# Patient Record
Sex: FEMALE | Race: WHITE | NOT HISPANIC OR LATINO | URBAN - METROPOLITAN AREA
[De-identification: names, ages, dates, MRNs, and addresses within clinical notes are randomized per-mention and may not be internally consistent; named-entity substitution may affect disease eponyms.]

---

## 2018-08-28 PROBLEM — J30.9 ALLERGIC RHINITIS: Status: ACTIVE | Noted: 2018-08-28

## 2018-08-28 PROBLEM — F41.9 ANXIETY: Status: ACTIVE | Noted: 2018-08-28

## 2018-08-30 ENCOUNTER — OFFICE VISIT (OUTPATIENT)
Dept: INTERNAL MEDICINE | Age: 43
End: 2018-08-30

## 2018-08-30 VITALS
OXYGEN SATURATION: 100 % | SYSTOLIC BLOOD PRESSURE: 116 MMHG | TEMPERATURE: 97.9 F | HEART RATE: 101 BPM | HEIGHT: 64 IN | BODY MASS INDEX: 26.87 KG/M2 | DIASTOLIC BLOOD PRESSURE: 80 MMHG | WEIGHT: 157.4 LBS

## 2018-08-30 DIAGNOSIS — Z00.00 ANNUAL PHYSICAL EXAM: Primary | ICD-10-CM

## 2018-08-30 DIAGNOSIS — Z13.220 SCREENING FOR LIPID DISORDERS: ICD-10-CM

## 2018-08-30 DIAGNOSIS — Z12.11 COLON CANCER SCREENING: ICD-10-CM

## 2018-08-30 DIAGNOSIS — Z13.1 SCREENING FOR DIABETES MELLITUS: ICD-10-CM

## 2018-08-30 DIAGNOSIS — Z00.00 ROUTINE ADULT HEALTH MAINTENANCE: ICD-10-CM

## 2018-08-30 PROCEDURE — 99396 PREV VISIT EST AGE 40-64: CPT | Performed by: NURSE PRACTITIONER

## 2018-08-30 RX ORDER — MELATONIN
1000 DAILY
COMMUNITY

## 2018-08-30 RX ORDER — CHLORAL HYDRATE 500 MG
2000 CAPSULE ORAL
COMMUNITY

## 2018-08-30 NOTE — PROGRESS NOTES
"[facility] [place] [facility]        Crystal Garcia / 43 y.o. / female  08/30/2018      ASSESSMENT & PLAN:    Problem List Items Addressed This Visit     None        No orders of the defined types were placed in this encounter.    No orders of the defined types were placed in this encounter.      Summary/Discussion:  ***    No Follow-up on file.    ____________________________________________________________________    VITALS:    Visit Vitals  /80   Pulse 101   Temp 97.9 °F (36.6 °C)   Ht 162.6 cm (64\")   Wt 71.4 kg (157 lb 6.4 oz)   SpO2 100%   BMI 27.02 kg/m²       BP Readings from Last 3 Encounters:   08/30/18 116/80   04/18/18 125/85   04/09/18 121/86     Wt Readings from Last 3 Encounters:   08/30/18 71.4 kg (157 lb 6.4 oz)   04/18/18 68 kg (150 lb)   04/09/18 68 kg (150 lb)      Body mass index is 27.02 kg/m².    CC: Main reason(s) for today's visit: Establish Care and Annual Exam      HPI:    Patient is a 43 y.o. female who is here to establish care.       Patient Care Team:  Davina Alex APRN as PCP - General (Internal Medicine)  ____________________________________________________________________      REVIEW OF SYSTEMS    Review of Systems      PHYSICAL EXAMINATION    Physical Exam    REVIEWED DATA:    Labs:   No results found for: NA, K, AST, ALT, BUN, CREATININE, EGFRIFNONA, EGFRIFAFRI    No results found for: GLUCOSE, HGBA1C, MICROALBUR    No results found for: LDL, HDL, TRIG, CHOLHDLRATIO    No results found for: TSH, FREET4     No results found for: WBC, HGB, PLT      Imaging:        Medical Tests:        Summary of old records / correspondence / consultant report:        Request outside records:        ______________________________________________________________________    ALLERGIES  Allergies   Allergen Reactions   • Other Hives     STEROIDS         MEDICATIONS  Current Outpatient Prescriptions   Medication Sig Dispense Refill   • fluticasone (FLONASE) 50 MCG/ACT nasal spray into each nostril.   "   • azithromycin (ZITHROMAX Z-MILTON) 250 MG tablet Take 2 tablets the first day, then 1 tablet daily for 4 days. 6 tablet 0   • benzonatate (TESSALON) 200 MG capsule Take 1 capsule by mouth 3 (Three) Times a Day As Needed for Cough. 30 capsule 0   • Cefixime (SUPRAX) 400 MG tablet 1 capsule once a day 10 capsule 0   • Chlorcyclizine-Pseudoephed (STAHIST AD) 25-60 MG tablet 1 tablet 3 times a day as needed for congestion 30 tablet 0   • valACYclovir (VALTREX) 1000 MG tablet 2 PO Q12 4 tablet 5     No current facility-administered medications for this visit.        PFSH:     The following portions of the patient's history were reviewed and updated as appropriate: Allergies / Current Medications / Past Medical History / Surgical History / Social History / Family History    PROBLEM LIST   Patient Active Problem List   Diagnosis   • Allergic rhinitis   • Anxiety       PAST MEDICAL HISTORY  Past Medical History:   Diagnosis Date   • Allergic    • Asthma    • Headache    • Migraine headache        SURGICAL HISTORY  Past Surgical History:   Procedure Laterality Date   • HYSTERECTOMY  2009    partial        SOCIAL HISTORY  Social History     Social History   • Marital status:    • Number of children: 2     Occupational History   •        [employer]      Social History Main Topics   • Smoking status: Never Smoker   • Smokeless tobacco: Never Used   • Alcohol use Yes      Comment: occasional    • Drug use: No   • Sexual activity: Yes     Other Topics Concern   • Not on file     Social History Narrative    1 grandchild       FAMILY HISTORY  Family History   Problem Relation Age of Onset   • No Known Problems Mother    • No Known Problems Father          **Aleena Disclaimer:   Much of this encounter note is an electronic transcription/translation of spoken language to printed text. The electronic translation of spoken language may permit erroneous, or at times, nonsensical words or phrases to be  inadvertently transcribed. Although I have reviewed the note for such errors, some may still exist.

## 2018-08-30 NOTE — PATIENT INSTRUCTIONS
WOMEN'S FIRST APPOINTMENT LINE [phone]     START OTC 24 HOUR ANTIHISTAMINE (CLARITIN, ZYRTEC, ALLEGRA)       Health Maintenance, Female  Adopting a healthy lifestyle and getting preventive care can go a long way to promote health and wellness. Talk with your health care provider about what schedule of regular examinations is right for you. This is a good chance for you to check in with your provider about disease prevention and staying healthy.  In between checkups, there are plenty of things you can do on your own. Experts have done a lot of research about which lifestyle changes and preventive measures are most likely to keep you healthy. Ask your health care provider for more information.  Weight and diet  Eat a healthy diet  · Be sure to include plenty of vegetables, fruits, low-fat dairy products, and lean protein.  · Do not eat a lot of foods high in solid fats, added sugars, or salt.  · Get regular exercise. This is one of the most important things you can do for your health.  ? Most adults should exercise for at least 150 minutes each week. The exercise should increase your heart rate and make you sweat (moderate-intensity exercise).  ? Most adults should also do strengthening exercises at least twice a week. This is in addition to the moderate-intensity exercise.    Maintain a healthy weight  · Body mass index (BMI) is a measurement that can be used to identify possible weight problems. It estimates body fat based on height and weight. Your health care provider can help determine your BMI and help you achieve or maintain a healthy weight.  · For females 20 years of age and older:  ? A BMI below 18.5 is considered underweight.  ? A BMI of 18.5 to 24.9 is normal.  ? A BMI of 25 to 29.9 is considered overweight.  ? A BMI of 30 and above is considered obese.    Watch levels of cholesterol and blood lipids  · You should start having your blood tested for lipids and cholesterol at 20 years of age, then have  this test every 5 years.  · You may need to have your cholesterol levels checked more often if:  ? Your lipid or cholesterol levels are high.  ? You are older than 50 years of age.  ? You are at high risk for heart disease.    Cancer screening  Lung Cancer  · Lung cancer screening is recommended for adults 55-80 years old who are at high risk for lung cancer because of a history of smoking.  · A yearly low-dose CT scan of the lungs is recommended for people who:  ? Currently smoke.  ? Have quit within the past 15 years.  ? Have at least a 30-pack-year history of smoking. A pack year is smoking an average of one pack of cigarettes a day for 1 year.  · Yearly screening should continue until it has been 15 years since you quit.  · Yearly screening should stop if you develop a health problem that would prevent you from having lung cancer treatment.    Breast Cancer  · Practice breast self-awareness. This means understanding how your breasts normally appear and feel.  · It also means doing regular breast self-exams. Let your health care provider know about any changes, no matter how small.  · If you are in your 20s or 30s, you should have a clinical breast exam (CBE) by a health care provider every 1-3 years as part of a regular health exam.  · If you are 40 or older, have a CBE every year. Also consider having a breast X-ray (mammogram) every year.  · If you have a family history of breast cancer, talk to your health care provider about genetic screening.  · If you are at high risk for breast cancer, talk to your health care provider about having an MRI and a mammogram every year.  · Breast cancer gene (BRCA) assessment is recommended for women who have family members with BRCA-related cancers. BRCA-related cancers include:  ? Breast.  ? Ovarian.  ? Tubal.  ? Peritoneal cancers.  · Results of the assessment will determine the need for genetic counseling and BRCA1 and BRCA2 testing.    Cervical Cancer  Your health care  provider may recommend that you be screened regularly for cancer of the pelvic organs (ovaries, uterus, and vagina). This screening involves a pelvic examination, including checking for microscopic changes to the surface of your cervix (Pap test). You may be encouraged to have this screening done every 3 years, beginning at age 21.  · For women ages 30-65, health care providers may recommend pelvic exams and Pap testing every 3 years, or they may recommend the Pap and pelvic exam, combined with testing for human papilloma virus (HPV), every 5 years. Some types of HPV increase your risk of cervical cancer. Testing for HPV may also be done on women of any age with unclear Pap test results.  · Other health care providers may not recommend any screening for nonpregnant women who are considered low risk for pelvic cancer and who do not have symptoms. Ask your health care provider if a screening pelvic exam is right for you.  · If you have had past treatment for cervical cancer or a condition that could lead to cancer, you need Pap tests and screening for cancer for at least 20 years after your treatment. If Pap tests have been discontinued, your risk factors (such as having a new sexual partner) need to be reassessed to determine if screening should resume. Some women have medical problems that increase the chance of getting cervical cancer. In these cases, your health care provider may recommend more frequent screening and Pap tests.    Colorectal Cancer  · This type of cancer can be detected and often prevented.  · Routine colorectal cancer screening usually begins at 50 years of age and continues through 75 years of age.  · Your health care provider may recommend screening at an earlier age if you have risk factors for colon cancer.  · Your health care provider may also recommend using home test kits to check for hidden blood in the stool.  · A small camera at the end of a tube can be used to examine your colon  directly (sigmoidoscopy or colonoscopy). This is done to check for the earliest forms of colorectal cancer.  · Routine screening usually begins at age 50.  · Direct examination of the colon should be repeated every 5-10 years through 75 years of age. However, you may need to be screened more often if early forms of precancerous polyps or small growths are found.    Skin Cancer  · Check your skin from head to toe regularly.  · Tell your health care provider about any new moles or changes in moles, especially if there is a change in a mole's shape or color.  · Also tell your health care provider if you have a mole that is larger than the size of a pencil eraser.  · Always use sunscreen. Apply sunscreen liberally and repeatedly throughout the day.  · Protect yourself by wearing long sleeves, pants, a wide-brimmed hat, and sunglasses whenever you are outside.    Heart disease, diabetes, and high blood pressure  · High blood pressure causes heart disease and increases the risk of stroke. High blood pressure is more likely to develop in:  ? People who have blood pressure in the high end of the normal range (130-139/85-89 mm Hg).  ? People who are overweight or obese.  ? People who are .  · If you are 18-39 years of age, have your blood pressure checked every 3-5 years. If you are 40 years of age or older, have your blood pressure checked every year. You should have your blood pressure measured twice--once when you are at a hospital or clinic, and once when you are not at a hospital or clinic. Record the average of the two measurements. To check your blood pressure when you are not at a hospital or clinic, you can use:  ? An automated blood pressure machine at a pharmacy.  ? A home blood pressure monitor.  · If you are between 55 years and 79 years old, ask your health care provider if you should take aspirin to prevent strokes.  · Have regular diabetes screenings. This involves taking a blood sample to  check your fasting blood sugar level.  ? If you are at a normal weight and have a low risk for diabetes, have this test once every three years after 45 years of age.  ? If you are overweight and have a high risk for diabetes, consider being tested at a younger age or more often.  Preventing infection  Hepatitis B  · If you have a higher risk for hepatitis B, you should be screened for this virus. You are considered at high risk for hepatitis B if:  ? You were born in a country where hepatitis B is common. Ask your health care provider which countries are considered high risk.  ? Your parents were born in a high-risk country, and you have not been immunized against hepatitis B (hepatitis B vaccine).  ? You have HIV or AIDS.  ? You use needles to inject street drugs.  ? You live with someone who has hepatitis B.  ? You have had sex with someone who has hepatitis B.  ? You get hemodialysis treatment.  ? You take certain medicines for conditions, including cancer, organ transplantation, and autoimmune conditions.    Hepatitis C  · Blood testing is recommended for:  ? Everyone born from 1945 through 1965.  ? Anyone with known risk factors for hepatitis C.    Sexually transmitted infections (STIs)  · You should be screened for sexually transmitted infections (STIs) including gonorrhea and chlamydia if:  ? You are sexually active and are younger than 24 years of age.  ? You are older than 24 years of age and your health care provider tells you that you are at risk for this type of infection.  ? Your sexual activity has changed since you were last screened and you are at an increased risk for chlamydia or gonorrhea. Ask your health care provider if you are at risk.  · If you do not have HIV, but are at risk, it may be recommended that you take a prescription medicine daily to prevent HIV infection. This is called pre-exposure prophylaxis (PrEP). You are considered at risk if:  ? You are sexually active and do not regularly  use condoms or know the HIV status of your partner(s).  ? You take drugs by injection.  ? You are sexually active with a partner who has HIV.    Talk with your health care provider about whether you are at high risk of being infected with HIV. If you choose to begin PrEP, you should first be tested for HIV. You should then be tested every 3 months for as long as you are taking PrEP.  Pregnancy  · If you are premenopausal and you may become pregnant, ask your health care provider about preconception counseling.  · If you may become pregnant, take 400 to 800 micrograms (mcg) of folic acid every day.  · If you want to prevent pregnancy, talk to your health care provider about birth control (contraception).  Osteoporosis and menopause  · Osteoporosis is a disease in which the bones lose minerals and strength with aging. This can result in serious bone fractures. Your risk for osteoporosis can be identified using a bone density scan.  · If you are 65 years of age or older, or if you are at risk for osteoporosis and fractures, ask your health care provider if you should be screened.  · Ask your health care provider whether you should take a calcium or vitamin D supplement to lower your risk for osteoporosis.  · Menopause may have certain physical symptoms and risks.  · Hormone replacement therapy may reduce some of these symptoms and risks.  Talk to your health care provider about whether hormone replacement therapy is right for you.  Follow these instructions at home:  · Schedule regular health, dental, and eye exams.  · Stay current with your immunizations.  · Do not use any tobacco products including cigarettes, chewing tobacco, or electronic cigarettes.  · If you are pregnant, do not drink alcohol.  · If you are breastfeeding, limit how much and how often you drink alcohol.  · Limit alcohol intake to no more than 1 drink per day for nonpregnant women. One drink equals 12 ounces of beer, 5 ounces of wine, or 1½ ounces  of hard liquor.  · Do not use street drugs.  · Do not share needles.  · Ask your health care provider for help if you need support or information about quitting drugs.  · Tell your health care provider if you often feel depressed.  · Tell your health care provider if you have ever been abused or do not feel safe at home.  This information is not intended to replace advice given to you by your health care provider. Make sure you discuss any questions you have with your health care provider.  Document Released: 07/02/2012 Document Revised: 05/25/2017 Document Reviewed: 09/20/2016  [employer] Interactive Patient Education © 2018 [employer]

## 2018-09-07 LAB
25(OH)D3+25(OH)D2 SERPL-MCNC: 38.7 NG/ML (ref 30–100)
ALBUMIN SERPL-MCNC: 4.7 G/DL (ref 3.5–5.2)
ALBUMIN/GLOB SERPL: 1.6 G/DL
ALP SERPL-CCNC: 67 U/L (ref 39–117)
ALT SERPL-CCNC: 18 U/L (ref 1–33)
AST SERPL-CCNC: 18 U/L (ref 1–32)
BASOPHILS # BLD AUTO: 0.05 10*3/MM3 (ref 0–0.2)
BASOPHILS NFR BLD AUTO: 0.6 % (ref 0–1.5)
BILIRUB SERPL-MCNC: 0.3 MG/DL (ref 0.1–1.2)
BUN SERPL-MCNC: 10 MG/DL (ref 6–20)
BUN/CREAT SERPL: 15.2 (ref 7–25)
CALCIUM SERPL-MCNC: 9.2 MG/DL (ref 8.6–10.5)
CHLORIDE SERPL-SCNC: 101 MMOL/L (ref 98–107)
CHOLEST SERPL-MCNC: 213 MG/DL (ref 0–200)
CHOLEST/HDLC SERPL: 3.61 {RATIO}
CO2 SERPL-SCNC: 23 MMOL/L (ref 22–29)
CREAT SERPL-MCNC: 0.66 MG/DL (ref 0.57–1)
EOSINOPHIL # BLD AUTO: 0.29 10*3/MM3 (ref 0–0.7)
EOSINOPHIL NFR BLD AUTO: 3.4 % (ref 0.3–6.2)
ERYTHROCYTE [DISTWIDTH] IN BLOOD BY AUTOMATED COUNT: 13.3 % (ref 11.7–13)
GLOBULIN SER CALC-MCNC: 2.9 GM/DL
GLUCOSE SERPL-MCNC: 79 MG/DL (ref 65–99)
HCT VFR BLD AUTO: 41.7 % (ref 35.6–45.5)
HDLC SERPL-MCNC: 59 MG/DL (ref 40–60)
HGB BLD-MCNC: 13.7 G/DL (ref 11.9–15.5)
IMM GRANULOCYTES # BLD: 0.02 10*3/MM3 (ref 0–0.03)
IMM GRANULOCYTES NFR BLD: 0.2 % (ref 0–0.5)
LDLC SERPL CALC-MCNC: 137 MG/DL (ref 0–100)
LYMPHOCYTES # BLD AUTO: 2.09 10*3/MM3 (ref 0.9–4.8)
LYMPHOCYTES NFR BLD AUTO: 24.2 % (ref 19.6–45.3)
MCH RBC QN AUTO: 31.8 PG (ref 26.9–32)
MCHC RBC AUTO-ENTMCNC: 32.9 G/DL (ref 32.4–36.3)
MCV RBC AUTO: 96.8 FL (ref 80.5–98.2)
MONOCYTES # BLD AUTO: 0.59 10*3/MM3 (ref 0.2–1.2)
MONOCYTES NFR BLD AUTO: 6.8 % (ref 5–12)
NEUTROPHILS # BLD AUTO: 5.62 10*3/MM3 (ref 1.9–8.1)
NEUTROPHILS NFR BLD AUTO: 65 % (ref 42.7–76)
PLATELET # BLD AUTO: 455 10*3/MM3 (ref 140–500)
POTASSIUM SERPL-SCNC: 3.9 MMOL/L (ref 3.5–5.2)
PROT SERPL-MCNC: 7.6 G/DL (ref 6–8.5)
RBC # BLD AUTO: 4.31 10*6/MM3 (ref 3.9–5.2)
SODIUM SERPL-SCNC: 139 MMOL/L (ref 136–145)
TRIGL SERPL-MCNC: 84 MG/DL (ref 0–150)
TSH SERPL DL<=0.005 MIU/L-ACNC: 2.51 MIU/ML (ref 0.27–4.2)
VLDLC SERPL CALC-MCNC: 16.8 MG/DL (ref 5–40)
WBC # BLD AUTO: 8.64 10*3/MM3 (ref 4.5–10.7)

## 2018-09-19 ENCOUNTER — OFFICE VISIT (OUTPATIENT)
Dept: INTERNAL MEDICINE | Age: 43
End: 2018-09-19

## 2018-09-19 VITALS
HEIGHT: 64 IN | WEIGHT: 155 LBS | BODY MASS INDEX: 26.46 KG/M2 | SYSTOLIC BLOOD PRESSURE: 126 MMHG | DIASTOLIC BLOOD PRESSURE: 82 MMHG | HEART RATE: 85 BPM | OXYGEN SATURATION: 99 % | TEMPERATURE: 98.3 F

## 2018-09-19 DIAGNOSIS — J30.9 ALLERGIC RHINITIS, UNSPECIFIED CHRONICITY, UNSPECIFIED SEASONALITY, UNSPECIFIED TRIGGER: ICD-10-CM

## 2018-09-19 DIAGNOSIS — E78.5 HYPERLIPIDEMIA LDL GOAL <130: Primary | ICD-10-CM

## 2018-09-19 PROCEDURE — 99214 OFFICE O/P EST MOD 30 MIN: CPT | Performed by: NURSE PRACTITIONER

## 2018-09-19 RX ORDER — AZELASTINE 1 MG/ML
1 SPRAY, METERED NASAL 2 TIMES DAILY
Qty: 1 EACH | Refills: 5 | Status: SHIPPED | OUTPATIENT
Start: 2018-09-19 | End: 2019-06-05

## 2018-09-19 NOTE — PROGRESS NOTES
Subjective   Crystal Garcia is a 43 y.o. female.     History of Present Illness     Patient here today for follow-up lab results and allergic rhinitis.    She is concerned as her recent fasting labs showed some mild elevation in total cholesterol and LDL cholesterol.  She reports that she avoids eating red meat, eats chicken mostly. She does admit to eating carbs regularly including rice and potato chips. No family history significant for hyperlipidemia or heart disease or stroke.     Allergic Rhinitis: Crystal Garcia is here for re-evaluation of allergic rhinitis. Patient's symptoms include clear rhinorrhea and nasal congestion. These symptoms are seasonal. Current triggers include exposure to pollens. The patient has tried Flonase and Allegra since last visit with inadequate relief of symptoms. Immunotherapy has never been tried. The patient has never had nasal polyps. The patient has no history of asthma. The patient does not suffer from frequent sinopulmonary infections. The patient has not had sinus surgery in the past. The patient has no history of eczema.      The following portions of the patient's history were reviewed and updated as appropriate: allergies, current medications, past family history, past medical history, past social history, past surgical history and problem list.    Review of Systems   Constitutional: Negative for chills and fever.   Respiratory: Negative for shortness of breath.    Cardiovascular: Negative for chest pain.       Objective   Physical Exam   Constitutional: She appears well-developed and well-nourished. She is active. She does not appear ill. No distress.   Cardiovascular: Normal rate, regular rhythm and normal heart sounds.    Pulmonary/Chest: Effort normal and breath sounds normal. She has no decreased breath sounds. She has no wheezes. She has no rhonchi. She has no rales.   Neurological: She is alert.   Nursing note and vitals reviewed.        Assessment/Plan   Problems  Addressed this Visit        Respiratory    Allergic rhinitis    Relevant Medications    azelastine (ASTELIN) 0.1 % nasal spray      Other Visit Diagnoses     Hyperlipidemia LDL goal <130    -  Primary        1. Hyperlipidemia LDL goal <130  Discussed with patient calculated ASCVD Risk at this point 0.7%. No significant family history of hyperlipidemia, CVA, or CAD.  Discussed diet changes including reduction of simple carbohydrates, increased activity, and weight loss. Will re-evaluate FLP at annual physical next year.    2. Allergic rhinitis, unspecified chronicity, unspecified seasonality, unspecified trigger  Continue Flonase and by mouth antihistamine.  Will add azelastine spray to protocol.  Follow-up if still no improvement, may need to consider referral to allergy.     - azelastine (ASTELIN) 0.1 % nasal spray; 1 spray into the nostril(s) as directed by provider 2 (Two) Times a Day. Use in each nostril as directed  Dispense: 1 each; Refill: 5

## 2018-09-19 NOTE — PATIENT INSTRUCTIONS
"High Cholesterol    High cholesterol is a condition in which the blood has high levels of a white, waxy, fat-like substance (cholesterol). The human body needs small amounts of cholesterol. The liver makes all the cholesterol that the body needs. Extra (excess) cholesterol comes from the food that we eat.  Cholesterol is carried from the liver by the blood through the blood vessels. If you have high cholesterol, deposits (plaques) may build up on the walls of your blood vessels (arteries). Plaques make the arteries narrower and stiffer. Cholesterol plaques increase your risk for heart attack and stroke. Work with your health care provider to keep your cholesterol levels in a healthy range.  What increases the risk?  This condition is more likely to develop in people who:  · Eat foods that are high in animal fat (saturated fat) or cholesterol.  · Are overweight.  · Are not getting enough exercise.  · Have a family history of high cholesterol.    What are the signs or symptoms?  There are no symptoms of this condition.  How is this diagnosed?  This condition may be diagnosed from the results of a blood test.  · If you are older than age 20, your health care provider may check your cholesterol every 4-6 years.  · You may be checked more often if you already have high cholesterol or other risk factors for heart disease.    The blood test for cholesterol measures:  · \"Bad\" cholesterol (LDL cholesterol). This is the main type of cholesterol that causes heart disease. The desired level for LDL is less than 100.  · \"Good\" cholesterol (HDL cholesterol). This type helps to protect against heart disease by cleaning the arteries and carrying the LDL away. The desired level for HDL is 60 or higher.  · Triglycerides. These are fats that the body can store or burn for energy. The desired number for triglycerides is lower than 150.  · Total cholesterol. This is a measure of the total amount of cholesterol in your blood, including " LDL cholesterol, HDL cholesterol, and triglycerides. A healthy number is less than 200.    How is this treated?  This condition is treated with diet changes, lifestyle changes, and medicines.  Diet changes  · This may include eating more whole grains, fruits, vegetables, nuts, and fish.  · This may also include cutting back on red meat and foods that have a lot of added sugar.  Lifestyle changes  · Changes may include getting at least 40 minutes of aerobic exercise 3 times a week. Aerobic exercises include walking, biking, and swimming. Aerobic exercise along with a healthy diet can help you maintain a healthy weight.  · Changes may also include quitting smoking.  Medicines  · Medicines are usually given if diet and lifestyle changes have failed to reduce your cholesterol to healthy levels.  · Your health care provider may prescribe a statin medicine. Statin medicines have been shown to reduce cholesterol, which can reduce the risk of heart disease.  Follow these instructions at home:  Eating and drinking    If told by your health care provider:  · Eat chicken (without skin), fish, veal, shellfish, ground turkey breast, and round or loin cuts of red meat.  · Do not eat fried foods or fatty meats, such as hot dogs and salami.  · Eat plenty of fruits, such as apples.  · Eat plenty of vegetables, such as broccoli, potatoes, and carrots.  · Eat beans, peas, and lentils.  · Eat grains such as barley, rice, couscous, and bulgur wheat.  · Eat pasta without cream sauces.  · Use skim or nonfat milk, and eat low-fat or nonfat yogurt and cheeses.  · Do not eat or drink whole milk, cream, ice cream, egg yolks, or hard cheeses.  · Do not eat stick margarine or tub margarines that contain trans fats (also called partially hydrogenated oils).  · Do not eat saturated tropical oils, such as coconut oil and palm oil.  · Do not eat cakes, cookies, crackers, or other baked goods that contain trans fats.    General  instructions  · Exercise as directed by your health care provider. Increase your activity level with activities such as gardening, walking, and taking the stairs.  · Take over-the-counter and prescription medicines only as told by your health care provider.  · Do not use any products that contain nicotine or tobacco, such as cigarettes and e-cigarettes. If you need help quitting, ask your health care provider.  · Keep all follow-up visits as told by your health care provider. This is important.  Contact a health care provider if:  · You are struggling to maintain a healthy diet or weight.  · You need help to start on an exercise program.  · You need help to stop smoking.  Get help right away if:  · You have chest pain.  · You have trouble breathing.  This information is not intended to replace advice given to you by your health care provider. Make sure you discuss any questions you have with your health care provider.  Document Released: 12/18/2006 Document Revised: 07/15/2017 Document Reviewed: 06/17/2017  [employer] Interactive Patient Education © 2018 [employer].

## 2018-09-20 ENCOUNTER — APPOINTMENT (OUTPATIENT)
Dept: WOMENS IMAGING | Facility: HOSPITAL | Age: 43
End: 2018-09-20

## 2018-09-20 PROCEDURE — 77063 BREAST TOMOSYNTHESIS BI: CPT | Performed by: RADIOLOGY

## 2018-09-20 PROCEDURE — 77067 SCR MAMMO BI INCL CAD: CPT | Performed by: RADIOLOGY

## 2018-09-26 ENCOUNTER — APPOINTMENT (OUTPATIENT)
Dept: WOMENS IMAGING | Facility: HOSPITAL | Age: 43
End: 2018-09-26

## 2018-09-26 PROCEDURE — G0279 TOMOSYNTHESIS, MAMMO: HCPCS | Performed by: RADIOLOGY

## 2018-09-26 PROCEDURE — 77062 BREAST TOMOSYNTHESIS BI: CPT | Performed by: RADIOLOGY

## 2018-09-26 PROCEDURE — 77066 DX MAMMO INCL CAD BI: CPT | Performed by: RADIOLOGY

## 2018-09-26 PROCEDURE — 76641 ULTRASOUND BREAST COMPLETE: CPT | Performed by: RADIOLOGY

## 2019-06-05 ENCOUNTER — OFFICE VISIT (OUTPATIENT)
Dept: INTERNAL MEDICINE | Age: 44
End: 2019-06-05

## 2019-06-05 ENCOUNTER — TELEPHONE (OUTPATIENT)
Dept: INTERNAL MEDICINE | Age: 44
End: 2019-06-05

## 2019-06-05 VITALS
SYSTOLIC BLOOD PRESSURE: 132 MMHG | HEIGHT: 64 IN | HEART RATE: 95 BPM | TEMPERATURE: 98.3 F | DIASTOLIC BLOOD PRESSURE: 84 MMHG | WEIGHT: 157.9 LBS | OXYGEN SATURATION: 100 % | BODY MASS INDEX: 26.96 KG/M2

## 2019-06-05 DIAGNOSIS — T78.40XA ALLERGIC REACTION TO DRUG, INITIAL ENCOUNTER: Primary | ICD-10-CM

## 2019-06-05 DIAGNOSIS — R09.81 NASAL CONGESTION: ICD-10-CM

## 2019-06-05 PROCEDURE — 99214 OFFICE O/P EST MOD 30 MIN: CPT | Performed by: NURSE PRACTITIONER

## 2019-06-05 RX ORDER — IPRATROPIUM BROMIDE 21 UG/1
2 SPRAY, METERED NASAL EVERY 12 HOURS
Qty: 30 ML | Refills: 3 | Status: SHIPPED | OUTPATIENT
Start: 2019-06-05 | End: 2020-08-31 | Stop reason: SDUPTHER

## 2019-06-05 RX ORDER — DIPHENHYDRAMINE HCL 25 MG
25 TABLET ORAL EVERY 6 HOURS
Qty: 8 TABLET | Refills: 0 | COMMUNITY
Start: 2019-06-05 | End: 2019-06-07

## 2019-06-05 RX ORDER — EPINEPHRINE 0.3 MG/.3ML
0.3 INJECTION SUBCUTANEOUS ONCE
Qty: 2 EACH | Refills: 0 | Status: SHIPPED | OUTPATIENT
Start: 2019-06-05 | End: 2019-06-05

## 2019-06-05 RX ORDER — AZELASTINE 1 MG/ML
1 SPRAY, METERED NASAL 2 TIMES DAILY
Qty: 1 EACH | Refills: 3 | Status: SHIPPED | OUTPATIENT
Start: 2019-06-05 | End: 2020-07-02

## 2019-06-05 NOTE — TELEPHONE ENCOUNTER
Please call patient.     I have noted that she has omnicef (cefdinir) on her list of current medications prescribed from [facility]. Is she currently taking this?     If so, would recommend she stop for now as this can also cause allergic reaction. If not taking, make sure she does not start this medication until seen by allergist.

## 2019-06-05 NOTE — PROGRESS NOTES
[facility] INTERNAL MEDICINE  Davina Rojas Garcia / 44 y.o. / female  2019      ASSESSMENT & PLAN:    Problem List Items Addressed This Visit     None      Visit Diagnoses     Allergic reaction to drug, initial encounter    -  Primary    Relevant Medications    diphenhydrAMINE (BENADRYL ALLERGY) 25 MG tablet    EPINEPHrine (EPIPEN) 0.3 MG/0.3ML solution auto-injector injection    Other Relevant Orders    Ambulatory Referral to Allergy    Nasal congestion        Relevant Medications    azelastine (ASTELIN) 0.1 % nasal spray    ipratropium (ATROVENT) 0.03 % nasal spray    Other Relevant Orders    Ambulatory Referral to Allergy        Orders Placed This Encounter   Procedures   • Ambulatory Referral to Allergy     New Medications Ordered This Visit   Medications   • diphenhydrAMINE (BENADRYL ALLERGY) 25 MG tablet     Sig: Take 1 tablet by mouth Every 6 (Six) Hours for 2 days.     Dispense:  8 tablet     Refill:  0   • EPINEPHrine (EPIPEN) 0.3 MG/0.3ML solution auto-injector injection     Sig: Inject 0.3 mL into the appropriate muscle as directed by prescriber 1 (One) Time for 1 dose.     Dispense:  2 each     Refill:  0   • azelastine (ASTELIN) 0.1 % nasal spray     Si spray into the nostril(s) as directed by provider 2 (Two) Times a Day. Use in each nostril as directed     Dispense:  1 each     Refill:  3   • ipratropium (ATROVENT) 0.03 % nasal spray     Si sprays into the nostril(s) as directed by provider Every 12 (Twelve) Hours.     Dispense:  30 mL     Refill:  3       Summary/Discussion:    1. Allergic reaction to drug, initial encounter  Patient with recent allergic reaction to watermelon and suspect related check reaction to oral steroids.  Discussed with patient discontinue use of oral steroids at this time.  She is not in any acute or active distress on exam.  Recommended she take Benadryl every 6 hours, immediately following this visit for the next 24 to 48 hours.  Prescription is  also been written for EpiPen to have on hand and discussed with patient signs and symptoms of anaphylaxis and when to administer EpiPen.  Discussed with patient that should her subjective symptoms of throat tightness and/or shortness of breath worsen or persist, she needs to be seen in the emergency room for evaluation.  Also recommended holding off use of nasal corticosteroids at this time in addition to decongestant that she has been taking.  Will refer to allergist for further testing of new onset allergies.    - diphenhydrAMINE (BENADRYL ALLERGY) 25 MG tablet; Take 1 tablet by mouth Every 6 (Six) Hours for 2 days.  Dispense: 8 tablet; Refill: 0  - EPINEPHrine (EPIPEN) 0.3 MG/0.3ML solution auto-injector injection; Inject 0.3 mL into the appropriate muscle as directed by prescriber 1 (One) Time for 1 dose.  Dispense: 2 each; Refill: 0  - Ambulatory Referral to Allergy    2. Nasal congestion    - azelastine (ASTELIN) 0.1 % nasal spray; 1 spray into the nostril(s) as directed by provider 2 (Two) Times a Day. Use in each nostril as directed  Dispense: 1 each; Refill: 3  - Ambulatory Referral to Allergy  - ipratropium (ATROVENT) 0.03 % nasal spray; 2 sprays into the nostril(s) as directed by provider Every 12 (Twelve) Hours.  Dispense: 30 mL; Refill: 3        No Follow-up on file.    ____________________________________________________________________    MEDICATIONS  Current Outpatient Medications   Medication Sig Dispense Refill   • cholecalciferol (VITAMIN D3) 1000 units tablet Take 1,000 Units by mouth Daily.     • Collagen 500 MG capsule Take 500 mg by mouth Daily.     • fluticasone (FLONASE) 50 MCG/ACT nasal spray into each nostril.     • Multiple Vitamins-Minerals (CENTRUM ADULTS PO) Take 1 tablet by mouth Daily.     • Omega-3 Fatty Acids (FISH OIL) 1000 MG capsule capsule Take 2,000 mg by mouth Daily With Breakfast.     • Probiotic Product (PROBIOTIC DAILY PO) Take 1 capsule by mouth Daily.     • azelastine  "(ASTELIN) 0.1 % nasal spray 1 spray into the nostril(s) as directed by provider 2 (Two) Times a Day. Use in each nostril as directed 1 each 3   • cefdinir (OMNICEF) 300 MG capsule 2 capsules (at the same time) once a day 20 capsule 0   • diphenhydrAMINE (BENADRYL ALLERGY) 25 MG tablet Take 1 tablet by mouth Every 6 (Six) Hours for 2 days. 8 tablet 0   • EPINEPHrine (EPIPEN) 0.3 MG/0.3ML solution auto-injector injection Inject 0.3 mL into the appropriate muscle as directed by prescriber 1 (One) Time for 1 dose. 2 each 0   • ipratropium (ATROVENT) 0.03 % nasal spray 2 sprays into the nostril(s) as directed by provider Every 12 (Twelve) Hours. 30 mL 3   • predniSONE (DELTASONE) 20 MG tablet 3 po qd x 3 days, then 2 po qd x 2 days, then 1 po qd x 2days 15 tablet 0     No current facility-administered medications for this visit.           VITALS:    Visit Vitals  /84   Pulse 95   Temp 98.3 °F (36.8 °C)   Ht 162.6 cm (64\")   Wt 71.6 kg (157 lb 14.4 oz)   SpO2 100%   BMI 27.10 kg/m²       BP Readings from Last 3 Encounters:   06/05/19 132/84   06/03/19 (!) 84/60   09/19/18 126/82     Wt Readings from Last 3 Encounters:   06/05/19 71.6 kg (157 lb 14.4 oz)   06/03/19 68 kg (150 lb)   09/19/18 70.3 kg (155 lb)      Body mass index is 27.1 kg/m².    CC:  Main reason(s) for today's visit: Allergic Reaction ([place] [facility] [place] allergic reaction to watermelon; pt was Rx'd steroids, but has h/o allergy to steroids (hives); pt reports taking one dose of steroid this AM, and is breaking out on hands, w/ some SOA and difficulty swallowing)      HPI: Patient here today for follow-up allergic reaction.  She was seen in the urgent care center on 6/3/2019 for complaints of allergic reaction related to watermelon.    She had not had any previous issue with food allergies or watermelon prior.  She presented to the urgent care on Monday with symptoms of hives on her arms and legs with some scratchiness of throat and new " onset cough and subjective throat tightness.  At that time, she was given Benadryl injection in addition to oral steroids.  She reports she has taken 2 days of oral steroids at this time, and is noticed the rash worsening with each dose.  She does recall that she thinks she has had a previous reaction to steroids in the past.  She is also having some complaints of scratchiness in throat, feels like she is having some mild difficulties with swallowing which started this morning. Last dose of PO steroid was this AM.     Also having nasal congestion/sinusitis issues. Was taking Flonase previously, has been taking Benedryl with phenylephrine for congestion. Using Neti pot at home.     Patient Care Team:  Davina Alex APRN as PCP - General (Internal Medicine)    ____________________________________________________________________    REVIEW OF SYSTEMS    Review of Systems   Constitutional: Negative for activity change, appetite change, chills and fever.   HENT: Positive for congestion.    Respiratory: Positive for cough and shortness of breath. Negative for wheezing and stridor.    Gastrointestinal: Negative for nausea and vomiting.   Skin: Positive for rash.   Neurological: Negative for dizziness, light-headedness and headaches.         PHYSICAL EXAMINATION    Physical Exam   Constitutional: She is oriented to person, place, and time. Vital signs are normal. She appears well-developed and well-nourished. She is cooperative. She does not appear ill. No distress.   Cardiovascular: Normal rate, regular rhythm, S1 normal, S2 normal and normal heart sounds.   No murmur heard.  Pulmonary/Chest: Effort normal and breath sounds normal. She has no decreased breath sounds. She has no wheezes. She has no rhonchi. She has no rales.   Neurological: She is alert and oriented to person, place, and time.   Skin: Skin is warm, dry and intact. Rash noted. Rash is urticarial (bilateral forearms).   Psychiatric: She has a normal mood  and affect. Her speech is normal and behavior is normal. Judgment and thought content normal. Cognition and memory are normal.   Nursing note and vitals reviewed.      REVIEWED DATA:    Labs:     Lab Results   Component Value Date     09/07/2018    K 3.9 09/07/2018    AST 18 09/07/2018    ALT 18 09/07/2018    BUN 10 09/07/2018    CREATININE 0.66 09/07/2018    EGFRIFNONA 97 09/07/2018    EGFRIFAFRI 117 09/07/2018       No results found for: HGBA1C, GLUCOSE, MICROALBUR    Lab Results   Component Value Date     (H) 09/07/2018    HDL 59 09/07/2018    TRIG 84 09/07/2018    CHOLHDLRATIO 3.61 09/07/2018       Lab Results   Component Value Date    TSH 2.51 09/07/2018       Lab Results   Component Value Date    WBC 8.64 09/07/2018    HGB 13.7 09/07/2018     09/07/2018       No results found for: PROTEIN, GLUCOSEU, BLOODU, NITRITEU, LEUKOCYTESUR    Imaging:         Medical Tests:         Summary of old records / correspondence / consultant report:         Request outside records:         ALLERGIES  Allergies   Allergen Reactions   • Watermelon [Citrullus Vulgaris] Hives   • Other Hives     STEROIDS         PFSH:     The following portions of the patient's history were reviewed and updated as appropriate: Allergies / Current Medications / Past Medical History / Surgical History / Social History / Family History    PROBLEM LIST   Patient Active Problem List   Diagnosis   • Allergic rhinitis   • Anxiety       PAST MEDICAL HISTORY  Past Medical History:   Diagnosis Date   • Allergic    • Asthma    • Headache    • Migraine headache        SURGICAL HISTORY  Past Surgical History:   Procedure Laterality Date   • HYSTERECTOMY  2009    partial        SOCIAL HISTORY  Social History     Socioeconomic History   • Marital status:      Spouse name: Not on file   • Number of children: 2   • Years of education: Not on file   • Highest education level: Not on file   Occupational History   • Occupation: Beauty  Advisor      Comment: [employer]    Tobacco Use   • Smoking status: Never Smoker   • Smokeless tobacco: Never Used   Substance and Sexual Activity   • Alcohol use: Yes     Comment: occasional    • Drug use: No   • Sexual activity: Yes   Social History Narrative    1 grandchild       FAMILY HISTORY  Family History   Problem Relation Age of Onset   • No Known Problems Mother    • No Known Problems Father          **[place] Disclaimer:   Much of this encounter note is an electronic transcription/translation of spoken language to printed text. The electronic translation of spoken language may permit erroneous, or at times, nonsensical words or phrases to be inadvertently transcribed. Although I have reviewed the note for such errors, some may still exist.

## 2019-06-05 NOTE — PATIENT INSTRUCTIONS
Reacción anafiláctica, en adultos  Anaphylactic Reaction, Adult  Rekha reacción anafiláctica (anafilaxia) es rekha reacción alérgica repentina y grave. Afecta a más de rekha parte del cuerpo. Puede ser potencialmente mortal. Si usted tiene rekha reacción anafiláctica, necesita asistencia médica inmediata. Puede ser necesario que permanezca en el hospital.  El médico puede mostrarle cómo:  · Usar un kit para la alergia (kit de anafilaxia).  · Aplicarse rekha inyección para la alergia (inyección de epinefrina). Usar un “lápiz” autoinyector para aplicarse la inyección usted mismo.    Los signos de rekha reacción anafiláctica pueden incluir los siguientes:  · Zonas de piel hinchadas, [place] y que producen picazón (ronchas).  · Hinchazón en:  ? Los ojos.  ? Los labios.  ? La karina.  ? La boca.  ? La lengua.  ? La garganta.  · Dificultad para:  ? Respirar.  ? Hablar.  ? Tragar.  · Respiración ruidosa (sibilancias).  · Desmayarse.  · Tener alguna de estas sensaciones:  ? Calor en la karina (rubor).  ? Mareos.  ? Sensación de desvanecimiento.  · Dolor en el estómago.  · Vómitos.  · Heces acuosas.    Siga estas indicaciones en gustafson casa:  Seguridad  · Siempre lleve consigo un lápiz autoinyector o kit para la alergia. [place] puede salvar gustafson amy. Utilícelos paul se lo haya indicado el médico.  · No conduzca después de rekha reacción. Espere hasta que el médico le diga que es seguro conducir.  · Asegúrese de que las personas que viven o trabajan con usted, incluso gustafson empleador, sepan:  ? Cómo usar el kit para la alergia.  ? Cómo usar el lápiz autoinyector.  · Use un brazalete o un collar de alerta médico que indique que es alérgico, si gustafson médico se lo indica.  · Conozca los signos de rekha reacción alérgica muy grave. De esta manera, podrá tratarla de inmediato.  · Junto con gonzález médicos, planifique qué hacer si tiene rekha reacción alérgica muy grave. Es importante estar listo.  Si usted usa gustafson lápiz autoinyector:  · Obtenga más medicamento  (epinefrina) de inmediato. [place] es importante en [place] de que tenga otra reacción alérgica.  · Solicite ayuda de inmediato.  Para evitar rekha reacción alérgica grave:  · Evite las cosas que le produjeron anteriormente rekha reacción alérgica grave. Estos se denominan alérgenos.  · Informe al camarero sobre gustafson alergia cuando vaya a un restaurante. Si no está seguro de si gustafson comida contiene alimentos a los que usted es alérgico, pregúntele al camarero antes de comer.  Instrucciones generales  · [place] los medicamentos de venta lucía y los recetados solamente paul se lo haya indicado el médico.  · Si tiene zonas de la piel hinchadas, [place] y que pican o si tiene rekha erupción:  ? Use un medicamento de venta lucía (antihistamínico) pual se lo haya indicado el médico.  ? Aplique paños fríos y húmedos sobre la piel.  ? [place] rekha ducha o baño frío. Evite el [place].  · Informe a todos los médicos que lo atienden que usted tiene rekha alergia.  · Concurra a todas las visitas de seguimiento paul se lo haya indicado el médico. [place] es importante.  Solicite ayuda de inmediato si:  · Tiene signos de rekha reacción alérgica. Es posible que los note poco después de estar cerca de los alérgenos. Los signos pueden incluir lo siguiente:  ? Zonas de piel hinchadas, [place] y que producen picazón.  ? Hinchazón en:  § Los ojos.  § Los labios.  § La karina.  § La boca.  § La lengua.  § La garganta.  ? Dificultad para:  § Respirar.  § Hablar.  § Tragar.  ? Respiración ruidosa (sibilancias).  ? Desmayarse.  ? Tener alguna de estas sensaciones:  § Calor en la karina (rubor).  § Mareos.  § Sensación de desvanecimiento.  ? Dolor en el estómago.  ? Vómitos.  ? Heces acuosas.  · Tuvo que utilizar el lápiz autoinyector. Debe ir a rekha foreign de emergencias incluso si el medicamento parece estar surtiendo efecto. [place] se debe a que puede producirse otra reacción alérgica dentro de los 3 días (anafilaxia de rebote).  Estos síntomas pueden indicar rekha  emergencia. No espere hasta que los síntomas desaparezcan. Use el lápiz autoinyector o el kit para la alergia paul se lo hayan indicado. Solicite atención médica de inmediato. Comuníquese con el servicio de emergencias de gustafson localidad (911 en los [place]). No conduzca por gonzález propios medios hasta el hospital.  Resumen  · Rekha reacción anafiláctica (anafilaxia) es rekha reacción alérgica repentina y grave.  · Esta afección puede ser potencialmente mortal. Si usted tiene rekha reacción, obtenga ayuda médica de inmediato.  · El médico puede mostrarle cómo usar un kit para la alergia (kit de anafilaxia). También pueden mostrarle cómo aplicarse usted mismo rekha inyección para la alergia (inyección de epinefrina) con un “lápiz” autoinyector.  · Siempre lleve consigo gustafson lápiz autoinyector o kit para la alergia. [place] puede salvar gustafson amy. Utilícelos paul se lo haya indicado el médico.  · Si tuvo que usar el lápiz autoinyector, debe concurrir a la foreign de emergencias. Vaya incluso si el medicamento parece estar haciendo efecto.  Esta información no tiene paul fin reemplazar el consejo del médico. Asegúrese de hacerle al médico cualquier pregunta que tenga.  Document Released: 06/18/2013 Document Revised: 12/03/2018 Document Reviewed: 10/10/2018  Elsevier Interactive Patient Education © 2019 [employer] Inc.

## 2019-06-05 NOTE — TELEPHONE ENCOUNTER
I asked her about that during the visit. She has not started taking it [place] [facility] [place] I will call her.    CELIO

## 2019-07-01 ENCOUNTER — OFFICE VISIT (OUTPATIENT)
Dept: INTERNAL MEDICINE | Age: 44
End: 2019-07-01

## 2019-07-01 VITALS
HEART RATE: 100 BPM | WEIGHT: 153.4 LBS | BODY MASS INDEX: 26.19 KG/M2 | DIASTOLIC BLOOD PRESSURE: 82 MMHG | TEMPERATURE: 99.1 F | HEIGHT: 64 IN | SYSTOLIC BLOOD PRESSURE: 124 MMHG | OXYGEN SATURATION: 97 %
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
## 2019-07-01 DIAGNOSIS — F41.9 ANXIETY: ICD-10-CM

## 2019-07-01 DIAGNOSIS — G44.89 CHRONIC MIXED HEADACHE SYNDROME: Primary | ICD-10-CM

## 2019-07-01 DIAGNOSIS — M54.2 NECK PAIN ON RIGHT SIDE: ICD-10-CM

## 2019-07-01 PROCEDURE — 99214 OFFICE O/P EST MOD 30 MIN: CPT | Performed by: NURSE PRACTITIONER

## 2019-07-01 RX ORDER — PROPRANOLOL HCL 60 MG
60 CAPSULE, EXTENDED RELEASE 24HR ORAL DAILY
Qty: 30 CAPSULE | Refills: 2 | Status: SHIPPED | OUTPATIENT
Start: 2019-07-01 | End: 2019-12-10

## 2019-07-01 RX ORDER — SUMATRIPTAN 50 MG/1
TABLET, FILM COATED ORAL
Qty: 10 TABLET | Refills: 0 | Status: SHIPPED | OUTPATIENT
Start: 2019-07-01 | End: 2019-12-10 | Stop reason: SDUPTHER
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
## 2019-12-10 ENCOUNTER — OFFICE VISIT (OUTPATIENT)
Dept: INTERNAL MEDICINE | Age: 44
End: 2019-12-10

## 2019-12-10 VITALS
DIASTOLIC BLOOD PRESSURE: 88 MMHG | OXYGEN SATURATION: 99 % | SYSTOLIC BLOOD PRESSURE: 134 MMHG | TEMPERATURE: 97.9 F | HEIGHT: 64 IN | WEIGHT: 156 LBS | BODY MASS INDEX: 26.63 KG/M2 | HEART RATE: 78 BPM

## 2019-12-10 DIAGNOSIS — J01.00 ACUTE NON-RECURRENT MAXILLARY SINUSITIS: Primary | ICD-10-CM

## 2019-12-10 DIAGNOSIS — F41.9 ANXIETY: ICD-10-CM

## 2019-12-10 DIAGNOSIS — G44.89 CHRONIC MIXED HEADACHE SYNDROME: ICD-10-CM

## 2019-12-10 PROCEDURE — 99214 OFFICE O/P EST MOD 30 MIN: CPT | Performed by: NURSE PRACTITIONER

## 2019-12-10 RX ORDER — GUAIFENESIN, PSEUDOEPHEDRINE HYDROCHLORIDE 600; 60 MG/1; MG/1
1 TABLET, EXTENDED RELEASE ORAL EVERY 12 HOURS
Qty: 30 TABLET | Refills: 0 | Status: SHIPPED | OUTPATIENT
Start: 2019-12-10 | End: 2020-02-23

## 2019-12-10 RX ORDER — SUMATRIPTAN 50 MG/1
TABLET, FILM COATED ORAL
Qty: 10 TABLET | Refills: 2 | Status: SHIPPED | OUTPATIENT
Start: 2019-12-10 | End: 2020-05-04

## 2019-12-10 RX ORDER — AMOXICILLIN AND CLAVULANATE POTASSIUM 875; 125 MG/1; MG/1
1 TABLET, FILM COATED ORAL EVERY 12 HOURS SCHEDULED
Qty: 20 TABLET | Refills: 0 | Status: SHIPPED | OUTPATIENT
Start: 2019-12-10 | End: 2019-12-20

## 2019-12-10 RX ORDER — PROPRANOLOL HYDROCHLORIDE 80 MG/1
80 CAPSULE, EXTENDED RELEASE ORAL DAILY
Qty: 90 CAPSULE | Refills: 3 | Status: SHIPPED | OUTPATIENT
Start: 2019-12-10 | End: 2020-05-04 | Stop reason: SDUPTHER

## 2019-12-10 NOTE — PATIENT INSTRUCTIONS
Sinusitis, Adult  Sinusitis is inflammation of your sinuses. Sinuses are hollow spaces in the bones around your face. Your sinuses are located:  · Around your eyes.  · In the middle of your forehead.  · Behind your nose.  · In your cheekbones.  Mucus normally drains out of your sinuses. When your nasal tissues become inflamed or swollen, mucus can become trapped or blocked. This allows bacteria, viruses, and fungi to grow, which leads to infection. Most infections of the sinuses are caused by a virus.  Sinusitis can develop quickly. It can last for up to 4 weeks (acute) or for more than 12 weeks (chronic). Sinusitis often develops after a cold.  What are the causes?  This condition is caused by anything that creates swelling in the sinuses or stops mucus from draining. This includes:  · Allergies.  · Asthma.  · Infection from bacteria or viruses.  · Deformities or blockages in your nose or sinuses.  · Abnormal growths in the nose (nasal polyps).  · Pollutants, such as chemicals or irritants in the air.  · Infection from fungi (rare).  What increases the risk?  You are more likely to develop this condition if you:  · Have a weak body defense system (immune system).  · Do a lot of swimming or diving.  · Overuse nasal sprays.  · Smoke.  What are the signs or symptoms?  The main symptoms of this condition are pain and a feeling of pressure around the affected sinuses. Other symptoms include:  · Stuffy nose or congestion.  · Thick drainage from your nose.  · Swelling and warmth over the affected sinuses.  · Headache.  · Upper toothache.  · A cough that may get worse at night.  · Extra mucus that collects in the throat or the back of the nose (postnasal drip).  · Decreased sense of smell and taste.  · Fatigue.  · A fever.  · Sore throat.  · Bad breath.  How is this diagnosed?  This condition is diagnosed based on:  · Your symptoms.  · Your medical history.  · A physical exam.  · Tests to find out if your condition is  acute or chronic. This may include:  ? Checking your nose for nasal polyps.  ? Viewing your sinuses using a device that has a light (endoscope).  ? Testing for allergies or bacteria.  ? Imaging tests, such as an MRI or CT scan.  In rare cases, a bone biopsy may be done to rule out more serious types of fungal sinus disease.  How is this treated?  Treatment for sinusitis depends on the cause and whether your condition is chronic or acute.  · If caused by a virus, your symptoms should go away on their own within 10 days. You may be given medicines to relieve symptoms. They include:  ? Medicines that shrink swollen nasal passages (topical intranasal decongestants).  ? Medicines that treat allergies (antihistamines).  ? A spray that eases inflammation of the nostrils (topical intranasal corticosteroids).  ? Rinses that help get rid of thick mucus in your nose (nasal saline washes).  · If caused by bacteria, your health care provider may recommend waiting to see if your symptoms improve. Most bacterial infections will get better without antibiotic medicine. You may be given antibiotics if you have:  ? A severe infection.  ? A weak immune system.  · If caused by narrow nasal passages or nasal polyps, you may need to have surgery.  Follow these instructions at home:  Medicines  · Take, use, or apply over-the-counter and prescription medicines only as told by your health care provider. These may include nasal sprays.  · If you were prescribed an antibiotic medicine, take it as told by your health care provider. Do not stop taking the antibiotic even if you start to feel better.  Hydrate and humidify    · Drink enough fluid to keep your urine pale yellow. Staying hydrated will help to thin your mucus.  · Use a cool mist humidifier to keep the humidity level in your home above 50%.  · Inhale steam for 10-15 minutes, 3-4 times a day, or as told by your health care provider. You can do this in the bathroom while a hot shower is  running.  · Limit your exposure to cool or dry air.  Rest  · Rest as much as possible.  · Sleep with your head raised (elevated).  · Make sure you get enough sleep each night.  General instructions    · Apply a warm, moist washcloth to your face 3-4 times a day or as told by your health care provider. This will help with discomfort.  · Wash your hands often with soap and water to reduce your exposure to germs. If soap and water are not available, use hand .  · Do not smoke. Avoid being around people who are smoking (secondhand smoke).  · Keep all follow-up visits as told by your health care provider. This is important.  Contact a health care provider if:  · You have a fever.  · Your symptoms get worse.  · Your symptoms do not improve within 10 days.  Get help right away if:  · You have a severe headache.  · You have persistent vomiting.  · You have severe pain or swelling around your face or eyes.  · You have vision problems.  · You develop confusion.  · Your neck is stiff.  · You have trouble breathing.  Summary  · Sinusitis is soreness and inflammation of your sinuses. Sinuses are hollow spaces in the bones around your face.  · This condition is caused by nasal tissues that become inflamed or swollen. The swelling traps or blocks the flow of mucus. This allows bacteria, viruses, and fungi to grow, which leads to infection.  · If you were prescribed an antibiotic medicine, take it as told by your health care provider. Do not stop taking the antibiotic even if you start to feel better.  · Keep all follow-up visits as told by your health care provider. This is important.  This information is not intended to replace advice given to you by your health care provider. Make sure you discuss any questions you have with your health care provider.  Document Released: 12/18/2006 Document Revised: 05/20/2019 Document Reviewed: 05/20/2019  [employer] Interactive Patient Education © 2019 [employer]

## 2019-12-10 NOTE — PROGRESS NOTES
[facility] [place] INTERNAL MEDICINE  Davina Rojas Garcia / 44 y.o. / female  12/10/2019      ASSESSMENT & PLAN:    Problem List Items Addressed This Visit        Other    Anxiety    Relevant Medications    propranolol LA (INDERAL LA) 80 MG 24 hr capsule      Other Visit Diagnoses     Acute non-recurrent maxillary sinusitis    -  Primary    Relevant Medications    amoxicillin-clavulanate (AUGMENTIN) 875-125 MG per tablet    pseudoephedrine-guaifenesin (MUCINEX D)  MG per 12 hr tablet    Chronic mixed headache syndrome        Relevant Medications    SUMAtriptan (IMITREX) 50 MG tablet    propranolol LA (INDERAL LA) 80 MG 24 hr capsule        No orders of the defined types were placed in this encounter.    New Medications Ordered This Visit   Medications   • SUMAtriptan (IMITREX) 50 MG tablet     Sig: Take one tablet at onset of headache. May repeat dose one time in 2 hours if headache not relieved.     Dispense:  10 tablet     Refill:  2   • propranolol LA (INDERAL LA) 80 MG 24 hr capsule     Sig: Take 1 capsule by mouth Daily.     Dispense:  90 capsule     Refill:  3   • amoxicillin-clavulanate (AUGMENTIN) 875-125 MG per tablet     Sig: Take 1 tablet by mouth Every 12 (Twelve) Hours for 10 days.     Dispense:  20 tablet     Refill:  0   • pseudoephedrine-guaifenesin (MUCINEX D)  MG per 12 hr tablet     Sig: Take 1 tablet by mouth Every 12 (Twelve) Hours.     Dispense:  30 tablet     Refill:  0       Summary/Discussion:    1. Acute non-recurrent maxillary sinusitis    - amoxicillin-clavulanate (AUGMENTIN) 875-125 MG per tablet; Take 1 tablet by mouth Every 12 (Twelve) Hours for 10 days.  Dispense: 20 tablet; Refill: 0  - pseudoephedrine-guaifenesin (MUCINEX D)  MG per 12 hr tablet; Take 1 tablet by mouth Every 12 (Twelve) Hours.  Dispense: 30 tablet; Refill: 0    2. Chronic mixed headache syndrome  Increase Inderal to 80 mg daily.  Refill sumatriptan and to use as needed.  Will reevaluate in  approximately 2 months.    - SUMAtriptan (IMITREX) 50 MG tablet; Take one tablet at onset of headache. May repeat dose one time in 2 hours if headache not relieved.  Dispense: 10 tablet; Refill: 2  - propranolol LA (INDERAL LA) 80 MG 24 hr capsule; Take 1 capsule by mouth Daily.  Dispense: 90 capsule; Refill: 3    3. Anxiety    - propranolol LA (INDERAL LA) 80 MG 24 hr capsule; Take 1 capsule by mouth Daily.  Dispense: 90 capsule; Refill: 3        Return in about 2 months (around 2/10/2020) for Next scheduled follow up.    ____________________________________________________________________    MEDICATIONS  Current Outpatient Medications   Medication Sig Dispense Refill   • azelastine (ASTELIN) 0.1 % nasal spray 1 spray into the nostril(s) as directed by provider 2 (Two) Times a Day. Use in each nostril as directed 1 each 3   • cholecalciferol (VITAMIN D3) 1000 units tablet Take 1,000 Units by mouth Daily.     • Collagen 500 MG capsule Take 500 mg by mouth Daily.     • ipratropium (ATROVENT) 0.03 % nasal spray 2 sprays into the nostril(s) as directed by provider Every 12 (Twelve) Hours. 30 mL 3   • Multiple Vitamins-Minerals (CENTRUM ADULTS PO) Take 1 tablet by mouth Daily.     • Omega-3 Fatty Acids (FISH OIL) 1000 MG capsule capsule Take 2,000 mg by mouth Daily With Breakfast.     • Probiotic Product (PROBIOTIC DAILY PO) Take 1 capsule by mouth Daily.     • amoxicillin-clavulanate (AUGMENTIN) 875-125 MG per tablet Take 1 tablet by mouth Every 12 (Twelve) Hours for 10 days. 20 tablet 0   • fluticasone (FLONASE) 50 MCG/ACT nasal spray into each nostril.     • propranolol LA (INDERAL LA) 80 MG 24 hr capsule Take 1 capsule by mouth Daily. 90 capsule 3   • pseudoephedrine-guaifenesin (MUCINEX D)  MG per 12 hr tablet Take 1 tablet by mouth Every 12 (Twelve) Hours. 30 tablet 0   • SUMAtriptan (IMITREX) 50 MG tablet Take one tablet at onset of headache. May repeat dose one time in 2 hours if headache not relieved. 10  "tablet 2     No current facility-administered medications for this visit.           VITALS:    Visit Vitals  /88   Pulse 78   Temp 97.9 °F (36.6 °C)   Ht 162.6 cm (64.02\")   Wt 70.8 kg (156 lb)   SpO2 99%   BMI 26.76 kg/m²       BP Readings from Last 3 Encounters:   12/10/19 134/88   07/01/19 124/82   06/05/19 132/84     Wt Readings from Last 3 Encounters:   12/10/19 70.8 kg (156 lb)   07/01/19 69.6 kg (153 lb 6.4 oz)   06/05/19 71.6 kg (157 lb 14.4 oz)      Body mass index is 26.76 kg/m².    CC:  Main reason(s) for today's visit: Nasal Congestion (Green discharge & no taste x 2 wks ); Cough (Cough is worse in morning); and Headache (Pain behind AD across face to jaw, ear and down into neck. )      HPI:     Sinusitis: Patient complains of congestion, cough described as nonproductive, facial pain, headache described as intermittent/pressure, nasal congestion, post nasal drip and sinus pressure. Onset of symptoms was 2 weeks ago, gradually worsening since that time. She is drinking plenty of fluids.  Past history is significant for no history of pneumonia or bronchitis. Patient is non-smoker.     Migraines: Started Inderal LA 60 mg at last office visit for migraine prophylaxis.  She has not noticed any improvement in migraine since starting the medication, reports at least Thiry migraine days per month.  She has been using sumatriptan hand which has been effective for abortive therapy.       Patient Care Team:  Davina Alex APRN as PCP - General (Internal Medicine)    ____________________________________________________________________    REVIEW OF SYSTEMS    Review of Systems   Constitutional: Negative for fever.   HENT: Positive for congestion, postnasal drip, sinus pressure, sinus pain and sore throat. Negative for ear pain, rhinorrhea, sneezing and trouble swallowing.    Eyes: Negative for discharge, redness and itching.   Respiratory: Positive for cough. Negative for shortness of breath and wheezing.  "   Cardiovascular: Negative for chest pain.   Gastrointestinal: Negative for diarrhea, nausea and vomiting.   Musculoskeletal: Negative for arthralgias and myalgias.   Neurological: Positive for headaches.   Hematological: Negative for adenopathy.         PHYSICAL EXAMINATION    Physical Exam   Constitutional: She is oriented to person, place, and time. Vital signs are normal. She appears well-developed and well-nourished. She is cooperative. She does not appear ill. No distress.   HENT:   Head: Normocephalic and atraumatic.   Right Ear: Hearing, external ear and ear canal normal. No drainage or swelling. Tympanic membrane is not injected and not erythematous. A middle ear effusion is present.   Left Ear: Hearing, external ear and ear canal normal. No drainage or swelling. Tympanic membrane is not injected and not erythematous. A middle ear effusion is present.   Nose: Right sinus exhibits maxillary sinus tenderness and frontal sinus tenderness.   Mouth/Throat: Uvula is midline, oropharynx is clear and moist and mucous membranes are normal. No tonsillar exudate.   Cardiovascular: Normal rate, regular rhythm and normal heart sounds.   No murmur heard.  Pulmonary/Chest: Effort normal and breath sounds normal. She has no decreased breath sounds. She has no wheezes. She has no rhonchi. She has no rales.   Lymphadenopathy:     She has no cervical adenopathy.        Right cervical: No superficial cervical, no deep cervical and no posterior cervical adenopathy present.       Left cervical: No superficial cervical, no deep cervical and no posterior cervical adenopathy present.   Neurological: She is alert and oriented to person, place, and time.   Skin: Skin is warm, dry and intact.   Psychiatric: She has a normal mood and affect. Her speech is normal and behavior is normal. Judgment and thought content normal. Cognition and memory are normal.   Nursing note and vitals reviewed.      REVIEWED DATA:    Labs:     Lab Results    Component Value Date     09/07/2018    K 3.9 09/07/2018    AST 18 09/07/2018    ALT 18 09/07/2018    BUN 10 09/07/2018    CREATININE 0.66 09/07/2018    EGFRIFNONA 97 09/07/2018    EGFRIFAFRI 117 09/07/2018       No results found for: HGBA1C, GLUCOSE, MICROALBUR    Lab Results   Component Value Date     (H) 09/07/2018    HDL 59 09/07/2018    TRIG 84 09/07/2018    CHOLHDLRATIO 3.61 09/07/2018       Lab Results   Component Value Date    TSH 2.51 09/07/2018       Lab Results   Component Value Date    WBC 8.64 09/07/2018    HGB 13.7 09/07/2018     09/07/2018       No results found for: PROTEIN, GLUCOSEU, BLOODU, NITRITEU, LEUKOCYTESUR    Imaging:         Medical Tests:         Summary of old records / correspondence / consultant report:         Request outside records:         ALLERGIES  Allergies   Allergen Reactions   • Watermelon [Citrullus Vulgaris] Hives   • Other Hives     STEROIDS         PFSH:     The following portions of the patient's history were reviewed and updated as appropriate: Allergies / Current Medications / Past Medical History / Surgical History / Social History / Family History    PROBLEM LIST   Patient Active Problem List   Diagnosis   • Allergic rhinitis   • Anxiety       PAST MEDICAL HISTORY  Past Medical History:   Diagnosis Date   • Allergic    • Asthma    • Headache    • Migraine headache        SURGICAL HISTORY  Past Surgical History:   Procedure Laterality Date   • HYSTERECTOMY  2009    partial        SOCIAL HISTORY  Social History     Socioeconomic History   • Marital status:      Spouse name: Not on file   • Number of children: 2   • Years of education: Not on file   • Highest education level: Not on file   Occupational History   • Occupation:       Comment: [employer]    Tobacco Use   • Smoking status: Never Smoker   • Smokeless tobacco: Never Used   Substance and Sexual Activity   • Alcohol use: Yes     Comment: occasional    • Drug use:  No   • Sexual activity: Yes   Social History Narrative    1 grandchild       FAMILY HISTORY  Family History   Problem Relation Age of Onset   • No Known Problems Mother    • No Known Problems Father          **Aleena Disclaimer:   Much of this encounter note is an electronic transcription/translation of spoken language to printed text. The electronic translation of spoken language may permit erroneous, or at times, nonsensical words or phrases to be inadvertently transcribed. Although I have reviewed the note for such errors, some may still exist.

## 2020-01-02 ENCOUNTER — TELEPHONE (OUTPATIENT)
Dept: INTERNAL MEDICINE | Age: 45
End: 2020-01-02

## 2020-01-02 NOTE — TELEPHONE ENCOUNTER
Pt is requesting a rx refill of Mucinex D 60-600mg for her sinus issues.    Pls call pt if unable to refill rx & pt needs to make an appt.    Pls advise.    Pt can be reached #[phone].    [facility] # [phone].

## 2020-01-22 ENCOUNTER — OFFICE VISIT (OUTPATIENT)
Dept: ORTHOPEDIC SURGERY | Facility: CLINIC | Age: 45
End: 2020-01-22

## 2020-01-22 VITALS — TEMPERATURE: 97.9 F | HEIGHT: 64 IN | BODY MASS INDEX: 25.95 KG/M2 | WEIGHT: 152 LBS

## 2020-01-22 DIAGNOSIS — M47.22 CERVICAL SPONDYLOSIS WITH RADICULOPATHY: Primary | ICD-10-CM

## 2020-01-22 PROCEDURE — 99203 OFFICE O/P NEW LOW 30 MIN: CPT | Performed by: ORTHOPAEDIC SURGERY
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
## 2020-01-24 ENCOUNTER — TELEPHONE (OUTPATIENT)
Dept: ORTHOPEDIC SURGERY | Facility: CLINIC | Age: 45
End: 2020-01-24

## 2020-01-24 DIAGNOSIS — M47.22 CERVICAL SPONDYLOSIS WITH RADICULOPATHY: Primary | ICD-10-CM

## 2020-01-24 NOTE — TELEPHONE ENCOUNTER
Patient called back and she is going to drop off her xrays today.  She said you wanted to wait until you reviewed those to make any recommendations.

## 2020-01-28 NOTE — TELEPHONE ENCOUNTER
Tell her I reviewed the MRI and it does not show much.  Not the best MRI I have ever seen.  I do not see anything based on that with which I can further help her however if she wants to get a new MRI schedule her it [facility] [facility] and get better quality MRI and will see.  Otherwise she will have to live with it.

## 2020-01-29 NOTE — TELEPHONE ENCOUNTER
Order placed for new MRI; spoke with patient and informed and she was also given the phone number to [facility] scheduling.

## 2020-02-10 ENCOUNTER — APPOINTMENT (OUTPATIENT)
Dept: MRI IMAGING | Facility: HOSPITAL | Age: 45
End: 2020-02-10

## 2020-02-11 ENCOUNTER — TELEPHONE (OUTPATIENT)
Dept: ORTHOPEDIC SURGERY | Facility: CLINIC | Age: 45
End: 2020-02-11

## 2020-05-02 DIAGNOSIS — G44.89 CHRONIC MIXED HEADACHE SYNDROME: ICD-10-CM

## 2020-05-04 ENCOUNTER — OFFICE VISIT (OUTPATIENT)
Dept: INTERNAL MEDICINE | Age: 45
End: 2020-05-04

## 2020-05-04 VITALS
TEMPERATURE: 97.5 F | WEIGHT: 151 LBS | HEART RATE: 93 BPM | BODY MASS INDEX: 25.78 KG/M2 | OXYGEN SATURATION: 98 % | SYSTOLIC BLOOD PRESSURE: 130 MMHG | DIASTOLIC BLOOD PRESSURE: 80 MMHG | HEIGHT: 64 IN

## 2020-05-04 DIAGNOSIS — G44.89 CHRONIC MIXED HEADACHE SYNDROME: ICD-10-CM

## 2020-05-04 DIAGNOSIS — F41.9 ANXIETY: ICD-10-CM

## 2020-05-04 DIAGNOSIS — J32.9 RECURRENT SINUSITIS: Primary | ICD-10-CM

## 2020-05-04 PROCEDURE — 99214 OFFICE O/P EST MOD 30 MIN: CPT | Performed by: NURSE PRACTITIONER

## 2020-05-04 RX ORDER — METHOCARBAMOL 500 MG/1
500 TABLET, FILM COATED ORAL 3 TIMES DAILY PRN
Qty: 30 TABLET | Refills: 1 | Status: SHIPPED | OUTPATIENT
Start: 2020-05-04 | End: 2020-07-02 | Stop reason: DRUGHIGH

## 2020-05-04 RX ORDER — SUMATRIPTAN 50 MG/1
TABLET, FILM COATED ORAL
Qty: 10 TABLET | Refills: 2 | Status: SHIPPED | OUTPATIENT
Start: 2020-05-04 | End: 2020-05-04 | Stop reason: SDUPTHER

## 2020-05-04 RX ORDER — SUMATRIPTAN 50 MG/1
TABLET, FILM COATED ORAL
Qty: 10 TABLET | Refills: 2 | Status: SHIPPED | OUTPATIENT
Start: 2020-05-04 | End: 2020-08-31

## 2020-05-04 RX ORDER — GUAIFENESIN, PSEUDOEPHEDRINE HYDROCHLORIDE 600; 60 MG/1; MG/1
1 TABLET, EXTENDED RELEASE ORAL 2 TIMES DAILY PRN
Qty: 30 TABLET | Refills: 0 | Status: SHIPPED | OUTPATIENT
Start: 2020-05-04 | End: 2020-07-29

## 2020-05-04 RX ORDER — FLUTICASONE PROPIONATE 50 MCG
2 SPRAY, SUSPENSION (ML) NASAL DAILY
Qty: 1 BOTTLE | Refills: 2 | Status: SHIPPED | OUTPATIENT
Start: 2020-05-04 | End: 2020-07-02 | Stop reason: SDUPTHER

## 2020-05-04 RX ORDER — PROPRANOLOL HYDROCHLORIDE 120 MG/1
120 CAPSULE, EXTENDED RELEASE ORAL DAILY
Qty: 90 CAPSULE | Refills: 1 | Status: SHIPPED | OUTPATIENT
Start: 2020-05-04 | End: 2020-12-23

## 2020-05-04 NOTE — PATIENT INSTRUCTIONS
Sinus Headache    A sinus headache occurs when your sinuses become clogged or swollen. Sinuses are air-filled spaces in your skull that are behind the bones of your face and forehead. Sinus headaches can range from mild to severe.  What are the causes?  A sinus headache can result from various conditions that affect the sinuses. Common causes include:  · Colds.  · Sinus infections.  · Allergies.  Many people confuse sinus headaches with migraines or tension headaches because those headaches can also cause facial pain and nasal symptoms.  What are the signs or symptoms?  The main symptom of this condition is a headache that may feel like pain or pressure in your face, forehead, ears, or upper teeth. People who have a sinus headache often have other symptoms, such as:  · Congested or runny nose.  · Fever.  · Inability to smell.  Weather changes can make symptoms worse.  How is this diagnosed?  This condition may be diagnosed based on:  · A physical exam and medical history.  · Imaging tests, such as a CT scan or MRI, to check for problems with the sinuses.  · Examination of the sinuses using a thin tool with a camera that is inserted through your nose (endoscopy).  How is this treated?  Treatment for this condition depends on the cause.  · Sinus pain that is caused by a sinus infection may be treated with antibiotic medicine.  · Sinus pain that is caused by allergies may be helped by allergy medicines (antihistamines) and medicated nasal sprays.  · Sinus pain that is caused by congestion may be helped by rinsing out (flushing) the nose and sinuses with saline solution.  · Sinus surgery may be needed in some cases if other treatments do not help.  Follow these instructions at home:  General instructions  · If directed:  ? Apply a warm, moist washcloth to your face to help relieve pain.  ? Use a nasal saline wash.  Medicines    · Take over-the-counter and prescription medicines only as told by your health care  provider.  · If you were prescribed an antibiotic medicine, take it as told by your health care provider. Do not stop taking the antibiotic even if you start to feel better.  · If you have congestion, use a nasal spray to help lessen pressure.  Hydrate and humidify  · Drink enough water to keep your urine clear or pale yellow. Staying hydrated will help to thin your mucus.  · Use a cool mist humidifier to keep the humidity level in your home above 50%.  · Inhale steam for 10-15 minutes, 3-4 times a day or as told by your health care provider. You can do this in the bathroom while a hot shower is running.  · Limit your exposure to cool or dry air.  Contact a health care provider if:  · You have a headache more than one time a week.  · You have sensitivity to light or sound.  · You develop a fever.  · You feel nauseous or you vomit.  · Your headaches do not get better with treatment. Many people think that they have a sinus headache when they actually have a migraine or a tension headache.  Get help right away if:  · You have vision problems.  · You have sudden, severe pain in your face or head.  · You have a seizure.  · You are confused.  · You have a stiff neck.  Summary  · A sinus headache occurs when your sinuses become clogged or swollen.  · A sinus headache can result from various conditions that affect the sinuses, such as a cold, a sinus infection, or an allergy.  · Treatment for this condition depends on the cause. It may include medicine, such as antibiotics or antihistamines.  This information is not intended to replace advice given to you by your health care provider. Make sure you discuss any questions you have with your health care provider.  Document Released: 01/25/2006 Document Revised: 09/28/2018 Document Reviewed: 09/28/2018  [employer] Interactive Patient Education © 2020 [employer]        Tension Headache, Adult  A tension headache is a feeling of pain, pressure, or aching in the head that is  often felt over the front and sides of the head. The pain can be dull, or it can feel tight (constricting). There are two types of tension headache:  · Episodic tension headache. This is when the headaches happen fewer than 15 days a month.  · Chronic tension headache. This is when the headaches happen more than 15 days a month during a 3-month period.  A tension headache can last from 30 minutes to several days. It is the most common kind of headache. Tension headaches are not normally associated with nausea or vomiting, and they do not get worse with physical activity.  What are the causes?  The exact cause of this condition is not known. Tension headaches are often triggered by stress, anxiety, or depression. Other triggers include:  · Alcohol.  · Too much caffeine or caffeine withdrawal.  · Respiratory infections, such as colds, flu, or sinus infections.  · Dental problems or teeth clenching.  · Tiredness (fatigue).  · Holding your head and neck in the same position for a long period of time, such as while using a computer.  · Smoking.  · Arthritis of the neck.  What are the signs or symptoms?  Symptoms of this condition include:  · A feeling of pressure or tightness around the head.  · Dull, aching head pain.  · Pain over the front and sides of the head.  · Tenderness in the muscles of the head, neck, and shoulders.  How is this diagnosed?  This condition may be diagnosed based on your symptoms, your medical history, and a physical exam. If your symptoms are severe or unusual, you may have imaging tests, such as a CT scan or an MRI of your head. Your vision may also be checked.  How is this treated?  This condition may be treated with lifestyle changes and with medicines that help relieve symptoms.  Follow these instructions at home:  Managing pain  · Take over-the-counter and prescription medicines only as told by your health care provider.  · When you have a headache, lie down in a dark, quiet room.  · If  directed, apply ice to the head and neck:  ? Put ice in a plastic bag.  ? Place a towel between your skin and the bag.  ? Leave the ice on for 20 minutes, 2-3 times a day.  · If directed, apply heat to the back of your neck as often as told by your health care provider. Use the heat source that your health care provider recommends, such as a moist heat pack or a heating pad.  ? Place a towel between your skin and the heat source.  ? Leave the heat on for 20-30 minutes.  ? Remove the heat if your skin turns bright red. This is especially important if you are unable to feel pain, heat, or cold. You may have a greater risk of getting burned.  Eating and drinking  · Eat meals on a regular schedule.  · Limit alcohol intake to no more than 1 drink a day for nonpregnant women and 2 drinks a day for men. One drink equals 12 oz of beer, 5 oz of wine, or 1½ oz of hard liquor.  · Drink enough fluid to keep your urine pale yellow.  · Decrease your caffeine intake, or stop using caffeine.  Lifestyle  · Get 7-9 hours of sleep each night, or get the amount of sleep recommended by your health care provider.  · At bedtime, remove all electronic devices from your room. Electronic devices include computers, phones, and tablets.  · Find ways to manage your stress. Some things that can help relieve stress include:  ? Exercise.  ? Deep breathing exercises.  ? Yoga.  ? Listening to music.  ? Positive mental imagery.  · Try to sit up straight and avoid tensing your muscles.  · Do not use any products that contain nicotine or tobacco, such as cigarettes and e-cigarettes. If you need help quitting, ask your health care provider.  General instructions    · Keep all follow-up visits as told by your health care provider. This is important.  · Avoid any headache triggers. Keep a headache journal to help find out what may trigger your headaches. For example, write down:  ? What you eat and drink.  ? How much sleep you get.  ? Any change to your  diet or medicines.  Contact a health care provider if:  · Your headache does not get better.  · Your headache comes back.  · You are sensitive to sounds, light, or smells because of a headache.  · You have nausea or you vomit.  · Your stomach hurts.  Get help right away if:  · You suddenly develop a very severe headache along with any of the following:  ? A stiff neck.  ? Nausea and vomiting.  ? Confusion.  ? Weakness.  ? Double vision or loss of vision.  ? Shortness of breath.  ? Rash.  ? Unusual sleepiness.  ? Fever.  ? Trouble speaking.  ? Pain in your eyes or ears.  ? Trouble walking or balancing.  ? Feeling faint or passing out.  Summary  · A tension headache is a feeling of pain, pressure, or aching in the head that is often felt over the front and sides of the head.  · A tension headache can last from 30 minutes to several days. It is the most common kind of headache.  · This condition may be diagnosed based on your symptoms, your medical history, and a physical exam.  · This condition may be treated with lifestyle changes and with medicines that help relieve symptoms.  This information is not intended to replace advice given to you by your health care provider. Make sure you discuss any questions you have with your health care provider.  Document Released: 12/18/2006 Document Revised: 03/30/2018 Document Reviewed: 03/30/2018  [employer] Interactive Patient Education © 2020 [employer]

## 2020-05-04 NOTE — PROGRESS NOTES
[facility] [place] [facility]  Davina Rojas Garcia / 44 y.o. / female  2020      ASSESSMENT & PLAN:    Problem List Items Addressed This Visit        Other    Anxiety    Relevant Medications    propranolol LA (INDERAL LA) 120 MG 24 hr capsule      Other Visit Diagnoses     Recurrent sinusitis    -  Primary    Relevant Medications    SUMAtriptan (IMITREX) 50 MG tablet    pseudoephedrine-guaifenesin (MUCINEX D)  MG per 12 hr tablet    fluticasone (FLONASE) 50 MCG/ACT nasal spray    Other Relevant Orders    Ambulatory Referral to ENT (Otolaryngology)    Chronic mixed headache syndrome        Relevant Medications    SUMAtriptan (IMITREX) 50 MG tablet    propranolol LA (INDERAL LA) 120 MG 24 hr capsule    methocarbamol (Robaxin) 500 MG tablet        Orders Placed This Encounter   Procedures   • Ambulatory Referral to ENT (Otolaryngology)     New Medications Ordered This Visit   Medications   • SUMAtriptan (IMITREX) 50 MG tablet     Sig: Take one tablet at onset of headache. May repeat dose one time in 2 hours if headache not relieved.     Dispense:  10 tablet     Refill:  2   • pseudoephedrine-guaifenesin (MUCINEX D)  MG per 12 hr tablet     Sig: Take 1 tablet by mouth 2 (Two) Times a Day As Needed for Congestion or Allergies.     Dispense:  30 tablet     Refill:  0   • fluticasone (FLONASE) 50 MCG/ACT nasal spray     Si sprays into the nostril(s) as directed by provider Daily.     Dispense:  1 bottle     Refill:  2   • propranolol LA (INDERAL LA) 120 MG 24 hr capsule     Sig: Take 1 capsule by mouth Daily.     Dispense:  90 capsule     Refill:  1   • methocarbamol (Robaxin) 500 MG tablet     Sig: Take 1 tablet by mouth 3 (Three) Times a Day As Needed for Muscle Spasms.     Dispense:  30 tablet     Refill:  1       Summary/Discussion:    1. Recurrent sinusitis  Discussed with patient start nasal saline rinse (Neti pot) daily. Needs to start consistent use of Flonase daily.  Discussed with  patient need to work on reduction of use of nasal decongestants due to increased risk of rebound congestion.  She reports she is not tolerant to systemic antihistamines as they dry her out too much.  Can take Mucinex D with plenty of water as needed, although recommended against daily/consistent use.  Will refer to ENT for further evaluation of recurrent sinusitis/headache. No evidence of ABRS today, but recommended follow up with me as needed if worsening symptoms (ie. Purulent nasal discharge, fever, etc).     - SUMAtriptan (IMITREX) 50 MG tablet; Take one tablet at onset of headache. May repeat dose one time in 2 hours if headache not relieved.  Dispense: 10 tablet; Refill: 2  - pseudoephedrine-guaifenesin (MUCINEX D)  MG per 12 hr tablet; Take 1 tablet by mouth 2 (Two) Times a Day As Needed for Congestion or Allergies.  Dispense: 30 tablet; Refill: 0  - Ambulatory Referral to ENT (Otolaryngology)  - fluticasone (FLONASE) 50 MCG/ACT nasal spray; 2 sprays into the nostril(s) as directed by provider Daily.  Dispense: 1 bottle; Refill: 2    2. Chronic mixed headache syndrome  Increase dosage of propranolol to 120 mg daily for migraine prophylaxis.  Patient reports she has tried use of muscle relaxant methocarbamol in the past, which is been somewhat effective of her occipital headache.  Suspect this is likely tension type in nature.  Will refill relaxant at this time to use as needed.  Follow up for re-evaluation in 2 months.     - SUMAtriptan (IMITREX) 50 MG tablet; Take one tablet at onset of headache. May repeat dose one time in 2 hours if headache not relieved.  Dispense: 10 tablet; Refill: 2  - propranolol LA (INDERAL LA) 120 MG 24 hr capsule; Take 1 capsule by mouth Daily.  Dispense: 90 capsule; Refill: 1  - methocarbamol (Robaxin) 500 MG tablet; Take 1 tablet by mouth 3 (Three) Times a Day As Needed for Muscle Spasms.  Dispense: 30 tablet; Refill: 1    3. Anxiety    - propranolol LA (INDERAL LA) 120 MG  "24 hr capsule; Take 1 capsule by mouth Daily.  Dispense: 90 capsule; Refill: 1        Return in about 2 months (around 7/4/2020) for Next scheduled follow up.    ____________________________________________________________________    MEDICATIONS  Current Outpatient Medications   Medication Sig Dispense Refill   • azelastine (ASTELIN) 0.1 % nasal spray 1 spray into the nostril(s) as directed by provider 2 (Two) Times a Day. Use in each nostril as directed 1 each 3   • cholecalciferol (VITAMIN D3) 1000 units tablet Take 1,000 Units by mouth Daily.     • Collagen 500 MG capsule Take 500 mg by mouth Daily.     • fluticasone (FLONASE) 50 MCG/ACT nasal spray 2 sprays into the nostril(s) as directed by provider Daily. 1 bottle 2   • ipratropium (ATROVENT) 0.03 % nasal spray 2 sprays into the nostril(s) as directed by provider Every 12 (Twelve) Hours. 30 mL 3   • Multiple Vitamins-Minerals (CENTRUM ADULTS PO) Take 1 tablet by mouth Daily.     • Omega-3 Fatty Acids (FISH OIL) 1000 MG capsule capsule Take 2,000 mg by mouth Daily With Breakfast.     • Probiotic Product (PROBIOTIC DAILY PO) Take 1 capsule by mouth Daily.     • propranolol LA (INDERAL LA) 120 MG 24 hr capsule Take 1 capsule by mouth Daily. 90 capsule 1   • SUMAtriptan (IMITREX) 50 MG tablet Take one tablet at onset of headache. May repeat dose one time in 2 hours if headache not relieved. 10 tablet 2   • methocarbamol (Robaxin) 500 MG tablet Take 1 tablet by mouth 3 (Three) Times a Day As Needed for Muscle Spasms. 30 tablet 1   • pseudoephedrine-guaifenesin (MUCINEX D)  MG per 12 hr tablet Take 1 tablet by mouth 2 (Two) Times a Day As Needed for Congestion or Allergies. 30 tablet 0     No current facility-administered medications for this visit.           VITALS:    Visit Vitals  /80   Pulse 93   Temp 97.5 °F (36.4 °C) (Temporal)   Ht 162.6 cm (64.02\")   Wt 68.5 kg (151 lb)   SpO2 98%   BMI 25.91 kg/m²       BP Readings from Last 3 Encounters: "   05/04/20 130/80   02/23/20 124/83   12/10/19 134/88     Wt Readings from Last 3 Encounters:   05/04/20 68.5 kg (151 lb)   02/23/20 68 kg (150 lb)   01/22/20 68.9 kg (152 lb)      Body mass index is 25.91 kg/m².    CC:  Main reason(s) for today's visit: Anxiety; Sinusitis; and Headache      HPI:     Sinusitis: Patient presents with chronic sinusitis. The patient reports chronic sinus infections for several years.  Her symptoms include nasal congestion, headaches, facial pain.  There has not been a history of purulent rhinorrhea, fevers. There has not been a history of chronic otitis media or pharyngotonsillitis.  Prior antibiotic therapy has included no recent courses. Other medications have included Flonase, oral decongestants, topical decongestant sprays, nasal steroids, nasal saline spray. She reports she cannot tolerate use of systemic antihistamines because they dry her out. She has not been using Flonase on a regular basis. She has been using topical Afrin spray on a daily basis for a long period of time now. She does have a family history of nasal polyps.     Headache: Patient complains of headache. Reports chronic daily headache, lately has been localized to the frontal area and right occipital area. She attributes her current headaches to ongoing sinus issues.  Has a history of chronic migraine, has need to take Sumatriptan multiple times/week recently.       Patient Care Team:  Davina Alex APRN as PCP - General (Internal Medicine)    ____________________________________________________________________    REVIEW OF SYSTEMS    Review of Systems   Constitutional: Negative for fever.   HENT: Positive for congestion, sinus pressure and sinus pain. Negative for ear pain and postnasal drip.    Respiratory: Negative for shortness of breath.    Neurological: Positive for headaches. Negative for dizziness, light-headedness and numbness.         PHYSICAL EXAMINATION    Physical Exam   Constitutional: She is  oriented to person, place, and time. Vital signs are normal. She appears well-developed and well-nourished. She is cooperative. She does not appear ill. No distress.   Neurological: She is alert and oriented to person, place, and time. She has normal strength.   Nursing note and vitals reviewed.      REVIEWED DATA:    Labs:     Lab Results   Component Value Date     09/07/2018    K 3.9 09/07/2018    AST 18 09/07/2018    ALT 18 09/07/2018    BUN 10 09/07/2018    CREATININE 0.66 09/07/2018    EGFRIFNONA 97 09/07/2018    EGFRIFAFRI 117 09/07/2018       No results found for: HGBA1C, GLUCOSE, MICROALBUR    Lab Results   Component Value Date     (H) 09/07/2018    HDL 59 09/07/2018    TRIG 84 09/07/2018    CHOLHDLRATIO 3.61 09/07/2018       Lab Results   Component Value Date    TSH 2.51 09/07/2018       Lab Results   Component Value Date    WBC 8.64 09/07/2018    HGB 13.7 09/07/2018     09/07/2018       No results found for: PROTEIN, GLUCOSEU, BLOODU, NITRITEU, LEUKOCYTESUR    Imaging:         Medical Tests:         Summary of old records / correspondence / consultant report:         Request outside records:         ALLERGIES  Allergies   Allergen Reactions   • Watermelon [Citrullus Vulgaris] Hives   • Other Hives     STEROIDS         PFSH:     The following portions of the patient's history were reviewed and updated as appropriate: Allergies / Current Medications / Past Medical History / Surgical History / Social History / Family History    PROBLEM LIST   Patient Active Problem List   Diagnosis   • Allergic rhinitis   • Anxiety       PAST MEDICAL HISTORY  Past Medical History:   Diagnosis Date   • Allergic    • Asthma    • Headache    • Migraine headache        SURGICAL HISTORY  Past Surgical History:   Procedure Laterality Date   • HYSTERECTOMY  2009    partial        SOCIAL HISTORY  Social History     Socioeconomic History   • Marital status:      Spouse name: Not on file   • Number of  children: 2   • Years of education: Not on file   • Highest education level: Not on file   Occupational History   • Occupation:       Comment: [employer]    Tobacco Use   • Smoking status: Never Smoker   • Smokeless tobacco: Never Used   Substance and Sexual Activity   • Alcohol use: Yes     Comment: occasional    • Drug use: No   • Sexual activity: Yes   Social History Narrative    1 grandchild       FAMILY HISTORY  Family History   Problem Relation Age of Onset   • No Known Problems Mother    • No Known Problems Father          **Dragon Disclaimer:   Much of this encounter note is an electronic transcription/translation of spoken language to printed text. The electronic translation of spoken language may permit erroneous, or at times, nonsensical words or phrases to be inadvertently transcribed. Although I have reviewed the note for such errors, some may still exist.

## 2020-07-02 ENCOUNTER — OFFICE VISIT (OUTPATIENT)
Dept: INTERNAL MEDICINE | Age: 45
End: 2020-07-02

## 2020-07-02 VITALS
WEIGHT: 154 LBS | HEIGHT: 64 IN | BODY MASS INDEX: 26.29 KG/M2 | OXYGEN SATURATION: 99 % | HEART RATE: 72 BPM | SYSTOLIC BLOOD PRESSURE: 108 MMHG | TEMPERATURE: 98 F | DIASTOLIC BLOOD PRESSURE: 70 MMHG

## 2020-07-02 DIAGNOSIS — G44.89 CHRONIC MIXED HEADACHE SYNDROME: ICD-10-CM

## 2020-07-02 DIAGNOSIS — J32.9 RECURRENT SINUSITIS: ICD-10-CM

## 2020-07-02 DIAGNOSIS — R63.5 WEIGHT GAIN: ICD-10-CM

## 2020-07-02 DIAGNOSIS — J30.1 ALLERGIC RHINITIS DUE TO POLLEN, UNSPECIFIED SEASONALITY: Primary | ICD-10-CM

## 2020-07-02 LAB
ALBUMIN SERPL-MCNC: 4.4 G/DL (ref 3.5–5.2)
ALBUMIN/GLOB SERPL: 1.4 G/DL
ALP SERPL-CCNC: 68 U/L (ref 39–117)
ALT SERPL-CCNC: 13 U/L (ref 1–33)
AST SERPL-CCNC: 14 U/L (ref 1–32)
BILIRUB SERPL-MCNC: 0.2 MG/DL (ref 0.2–1.2)
BUN SERPL-MCNC: 11 MG/DL (ref 6–20)
BUN/CREAT SERPL: 15.1 (ref 7–25)
CALCIUM SERPL-MCNC: 9.3 MG/DL (ref 8.6–10.5)
CHLORIDE SERPL-SCNC: 104 MMOL/L (ref 98–107)
CO2 SERPL-SCNC: 25.8 MMOL/L (ref 22–29)
CREAT SERPL-MCNC: 0.73 MG/DL (ref 0.57–1)
GLOBULIN SER CALC-MCNC: 3.1 GM/DL
GLUCOSE SERPL-MCNC: 85 MG/DL (ref 65–99)
POTASSIUM SERPL-SCNC: 4.5 MMOL/L (ref 3.5–5.2)
PROT SERPL-MCNC: 7.5 G/DL (ref 6–8.5)
SODIUM SERPL-SCNC: 138 MMOL/L (ref 136–145)
TSH SERPL DL<=0.005 MIU/L-ACNC: 1.51 UIU/ML (ref 0.27–4.2)

## 2020-07-02 PROCEDURE — 99214 OFFICE O/P EST MOD 30 MIN: CPT | Performed by: NURSE PRACTITIONER

## 2020-07-02 RX ORDER — FEXOFENADINE HCL 180 MG/1
180 TABLET ORAL DAILY
COMMUNITY
Start: 2020-07-02 | End: 2021-02-04 | Stop reason: SDUPTHER

## 2020-07-02 RX ORDER — FLUTICASONE PROPIONATE 50 MCG
2 SPRAY, SUSPENSION (ML) NASAL DAILY
Qty: 1 BOTTLE | Refills: 5 | Status: SHIPPED | OUTPATIENT
Start: 2020-07-02 | End: 2021-02-04 | Stop reason: SDUPTHER

## 2020-07-02 RX ORDER — METHOCARBAMOL 750 MG/1
750 TABLET, FILM COATED ORAL 3 TIMES DAILY PRN
Qty: 30 TABLET | Refills: 0 | Status: SHIPPED | OUTPATIENT
Start: 2020-07-02 | End: 2020-07-29

## 2020-07-02 NOTE — PROGRESS NOTES
[facility] [place] [facility]  Davina Rojas Garcia / 45 y.o. / female  2020      ASSESSMENT & PLAN:    Problem List Items Addressed This Visit        Respiratory    Allergic rhinitis - Primary    Relevant Medications    fluticasone (FLONASE) 50 MCG/ACT nasal spray    fexofenadine (Allegra Allergy) 180 MG tablet      Other Visit Diagnoses     Recurrent sinusitis        Relevant Medications    fluticasone (FLONASE) 50 MCG/ACT nasal spray    Weight gain        Relevant Orders    TSH Rfx On Abnormal To Free T4    Comprehensive Metabolic Panel    Chronic mixed headache syndrome        Relevant Medications    methocarbamol (ROBAXIN) 750 MG tablet        Orders Placed This Encounter   Procedures   • TSH Rfx On Abnormal To Free T4   • Comprehensive Metabolic Panel     New Medications Ordered This Visit   Medications   • fluticasone (FLONASE) 50 MCG/ACT nasal spray     Si sprays into the nostril(s) as directed by provider Daily.     Dispense:  1 bottle     Refill:  5   • fexofenadine (Allegra Allergy) 180 MG tablet     Sig: Take 1 tablet by mouth Daily.   • methocarbamol (ROBAXIN) 750 MG tablet     Sig: Take 1 tablet by mouth 3 (Three) Times a Day As Needed for Muscle Spasms.     Dispense:  30 tablet     Refill:  0       Summary/Discussion:    1. Allergic rhinitis due to pollen, unspecified seasonality  Continue Flonase.  Stop Benadryl, switch to Allegra daily.  Continue use of saline nasal spray.    - fluticasone (FLONASE) 50 MCG/ACT nasal spray; 2 sprays into the nostril(s) as directed by provider Daily.  Dispense: 1 bottle; Refill: 5  - fexofenadine (Allegra Allergy) 180 MG tablet; Take 1 tablet by mouth Daily.    2. Recurrent sinusitis    - fluticasone (FLONASE) 50 MCG/ACT nasal spray; 2 sprays into the nostril(s) as directed by provider Daily.  Dispense: 1 bottle; Refill: 5    3. Weight gain  Check thyroid labs today.  Discussed with patient need to start following a low-carb diet in order to help  facilitate weight loss.    - TSH Rfx On Abnormal To Free T4  - Comprehensive Metabolic Panel    4. Chronic mixed headache syndrome  Stable on current therapy.  Patient reports the Robaxin-750 has been more effective for her in the past.  Will increase dosage to use as needed.    - methocarbamol (ROBAXIN) 750 MG tablet; Take 1 tablet by mouth 3 (Three) Times a Day As Needed for Muscle Spasms.  Dispense: 30 tablet; Refill: 0        Return in about 6 months (around 1/2/2021) for Annual physical with fasting labs 1 week prior, Next scheduled follow up.    ____________________________________________________________________    MEDICATIONS  Current Outpatient Medications   Medication Sig Dispense Refill   • cholecalciferol (VITAMIN D3) 1000 units tablet Take 1,000 Units by mouth Daily.     • Collagen 500 MG capsule Take 500 mg by mouth Daily.     • fluticasone (FLONASE) 50 MCG/ACT nasal spray 2 sprays into the nostril(s) as directed by provider Daily. 1 bottle 5   • ipratropium (ATROVENT) 0.03 % nasal spray 2 sprays into the nostril(s) as directed by provider Every 12 (Twelve) Hours. 30 mL 3   • Multiple Vitamins-Minerals (CENTRUM ADULTS PO) Take 1 tablet by mouth Daily.     • Omega-3 Fatty Acids (FISH OIL) 1000 MG capsule capsule Take 2,000 mg by mouth Daily With Breakfast.     • Probiotic Product (PROBIOTIC DAILY PO) Take 1 capsule by mouth Daily.     • propranolol LA (INDERAL LA) 120 MG 24 hr capsule Take 1 capsule by mouth Daily. 90 capsule 1   • pseudoephedrine-guaifenesin (MUCINEX D)  MG per 12 hr tablet Take 1 tablet by mouth 2 (Two) Times a Day As Needed for Congestion or Allergies. 30 tablet 0   • SUMAtriptan (IMITREX) 50 MG tablet Take one tablet at onset of headache. May repeat dose one time in 2 hours if headache not relieved. 10 tablet 2   • fexofenadine (Allegra Allergy) 180 MG tablet Take 1 tablet by mouth Daily.     • methocarbamol (ROBAXIN) 750 MG tablet Take 1 tablet by mouth 3 (Three) Times a Day  "As Needed for Muscle Spasms. 30 tablet 0     No current facility-administered medications for this visit.           VITALS:    Visit Vitals  /70   Pulse 72   Temp 98 °F (36.7 °C) (Temporal)   Ht 162.6 cm (64.02\")   Wt 69.9 kg (154 lb)   SpO2 99%   BMI 26.42 kg/m²       BP Readings from Last 3 Encounters:   07/02/20 108/70   05/04/20 130/80   02/23/20 124/83     Wt Readings from Last 3 Encounters:   07/02/20 69.9 kg (154 lb)   05/04/20 68.5 kg (151 lb)   02/23/20 68 kg (150 lb)      Body mass index is 26.42 kg/m².    CC:  Main reason(s) for today's visit: Anxiety and Headache      HPI:     Allergic Rhinitis: Recent visit to ENT for ongoing sinus congestion/sinusitis issues.  Had laryngoscope and CT scan of sinuses which were within normal limits.  Patient has continued on Flonase, use of saline nasal spray.  She does take Benadryl congestion every day.  She reports she cannot take azelastine nasal spray due to headaches and nasal dryness.    Migraines/Headaches: Overall headaches have improved since increase in dose of propanolol at last visit and addition of muscle relaxants.  Only having 2-3 headache days per month now.     She is also concerned about issues with losing weight.  She had partial hysterectomy a few years ago and reports since then she has had difficulties losing weight.  She is currently riding her bike approximately 3 days a week.  She is not following any type of dietary restriction and does admit to being more lenient with diet recently.       Patient Care Team:  Davina Alex APRN as PCP - General (Internal Medicine)    ____________________________________________________________________    REVIEW OF SYSTEMS    Review of Systems   Constitutional: Negative for activity change, appetite change and unexpected weight change.   HENT: Positive for congestion. Negative for tinnitus.    Eyes: Negative for visual disturbance.   Respiratory: Negative for cough, chest tightness and shortness of " breath.    Cardiovascular: Negative for chest pain, palpitations and leg swelling.   Allergic/Immunologic: Positive for environmental allergies.   Neurological: Negative for dizziness, light-headedness and headaches.   Psychiatric/Behavioral: Negative for self-injury, sleep disturbance and suicidal ideas. The patient is not nervous/anxious.          PHYSICAL EXAMINATION    Physical Exam   Constitutional: She is oriented to person, place, and time. Vital signs are normal. She appears well-developed and well-nourished. She is cooperative. She does not appear ill. No distress.   Cardiovascular: Normal rate, regular rhythm, S1 normal, S2 normal and normal heart sounds.   No murmur heard.  Pulmonary/Chest: Effort normal and breath sounds normal. She has no decreased breath sounds. She has no wheezes. She has no rhonchi. She has no rales.   Neurological: She is alert and oriented to person, place, and time.   Skin: Skin is warm, dry and intact.   Psychiatric: She has a normal mood and affect. Her speech is normal and behavior is normal. Judgment and thought content normal. Cognition and memory are normal.   Nursing note and vitals reviewed.      REVIEWED DATA:    Labs:     Lab Results   Component Value Date     09/07/2018    K 3.9 09/07/2018    AST 18 09/07/2018    ALT 18 09/07/2018    BUN 10 09/07/2018    CREATININE 0.66 09/07/2018    EGFRIFNONA 97 09/07/2018    EGFRIFAFRI 117 09/07/2018       No results found for: HGBA1C, GLUCOSE, MICROALBUR    Lab Results   Component Value Date     (H) 09/07/2018    HDL 59 09/07/2018    TRIG 84 09/07/2018    CHOLHDLRATIO 3.61 09/07/2018       Lab Results   Component Value Date    TSH 2.51 09/07/2018       Lab Results   Component Value Date    WBC 8.64 09/07/2018    HGB 13.7 09/07/2018     09/07/2018       No results found for: PROTEIN, GLUCOSEU, BLOODU, NITRITEU, LEUKOCYTESUR    Imaging:         Medical Tests:         Summary of old records / correspondence /  consultant report:         Request outside records:         ALLERGIES  Allergies   Allergen Reactions   • Watermelon [Citrullus Vulgaris] Hives   • Other Hives     STEROIDS         PFSH:     The following portions of the patient's history were reviewed and updated as appropriate: Allergies / Current Medications / Past Medical History / Surgical History / Social History / Family History    PROBLEM LIST   Patient Active Problem List   Diagnosis   • Allergic rhinitis   • Anxiety       PAST MEDICAL HISTORY  Past Medical History:   Diagnosis Date   • Allergic    • Asthma    • Headache    • Migraine headache        SURGICAL HISTORY  Past Surgical History:   Procedure Laterality Date   • HYSTERECTOMY  2009    partial        SOCIAL HISTORY  Social History     Socioeconomic History   • Marital status:      Spouse name: Not on file   • Number of children: 2   • Years of education: Not on file   • Highest education level: Not on file   Occupational History   • Occupation:       Comment: [employer]    Tobacco Use   • Smoking status: Never Smoker   • Smokeless tobacco: Never Used   Substance and Sexual Activity   • Alcohol use: Yes     Comment: occasional    • Drug use: No   • Sexual activity: Yes   Social History Narrative    1 grandchild       FAMILY HISTORY  Family History   Problem Relation Age of Onset   • No Known Problems Mother    • No Known Problems Father          **Aleena Disclaimer:   Much of this encounter note is an electronic transcription/translation of spoken language to printed text. The electronic translation of spoken language may permit erroneous, or at times, nonsensical words or phrases to be inadvertently transcribed. Although I have reviewed the note for such errors, some may still exist.

## 2020-07-28 DIAGNOSIS — G44.89 CHRONIC MIXED HEADACHE SYNDROME: ICD-10-CM

## 2020-07-28 DIAGNOSIS — J32.9 RECURRENT SINUSITIS: ICD-10-CM

## 2020-07-29 RX ORDER — METHOCARBAMOL 750 MG/1
TABLET, FILM COATED ORAL
Qty: 30 TABLET | Refills: 0 | Status: SHIPPED | OUTPATIENT
Start: 2020-07-29

## 2020-07-29 RX ORDER — GUAIFENESIN, PSEUDOEPHEDRINE HYDROCHLORIDE 600; 60 MG/1; MG/1
TABLET, EXTENDED RELEASE ORAL
Qty: 30 TABLET | Refills: 0 | Status: SHIPPED | OUTPATIENT
Start: 2020-07-29 | End: 2020-08-28

## 2020-08-28 DIAGNOSIS — J32.9 RECURRENT SINUSITIS: ICD-10-CM

## 2020-08-28 RX ORDER — GUAIFENESIN, PSEUDOEPHEDRINE HYDROCHLORIDE 600; 60 MG/1; MG/1
TABLET, EXTENDED RELEASE ORAL
Qty: 18 TABLET | Refills: 0 | Status: SHIPPED | OUTPATIENT
Start: 2020-08-28

## 2020-08-31 DIAGNOSIS — G44.89 CHRONIC MIXED HEADACHE SYNDROME: ICD-10-CM

## 2020-08-31 DIAGNOSIS — J32.9 RECURRENT SINUSITIS: ICD-10-CM

## 2020-08-31 DIAGNOSIS — R09.81 NASAL CONGESTION: ICD-10-CM

## 2020-08-31 RX ORDER — SUMATRIPTAN 50 MG/1
TABLET, FILM COATED ORAL
Qty: 20 TABLET | Refills: 1 | Status: SHIPPED | OUTPATIENT
Start: 2020-08-31 | End: 2021-02-04 | Stop reason: SDUPTHER

## 2020-08-31 RX ORDER — IPRATROPIUM BROMIDE 21 UG/1
2 SPRAY, METERED NASAL EVERY 12 HOURS
Qty: 30 ML | Refills: 3 | Status: SHIPPED | OUTPATIENT
Start: 2020-08-31 | End: 2021-02-04

## 2020-08-31 RX ORDER — SUMATRIPTAN 50 MG/1
TABLET, FILM COATED ORAL
Qty: 10 TABLET | Refills: 2 | OUTPATIENT
Start: 2020-08-31

## 2020-12-22 DIAGNOSIS — G44.89 CHRONIC MIXED HEADACHE SYNDROME: ICD-10-CM

## 2020-12-22 DIAGNOSIS — F41.9 ANXIETY: ICD-10-CM

## 2020-12-23 RX ORDER — PROPRANOLOL HYDROCHLORIDE 120 MG/1
CAPSULE, EXTENDED RELEASE ORAL
Qty: 90 CAPSULE | Refills: 0 | Status: SHIPPED | OUTPATIENT
Start: 2020-12-23 | End: 2021-02-04 | Stop reason: SDUPTHER

## 2020-12-29 DIAGNOSIS — Z00.00 ANNUAL PHYSICAL EXAM: Primary | ICD-10-CM

## 2021-02-03 ENCOUNTER — TELEPHONE (OUTPATIENT)
Dept: INTERNAL MEDICINE | Age: 46
End: 2021-02-03

## 2021-02-03 NOTE — TELEPHONE ENCOUNTER
Yes that is correct, and this patient is aware of that. I spoke with her about it on the telephone today.

## 2021-02-03 NOTE — TELEPHONE ENCOUNTER
patient tested positive for Covidi on January 19th. Patient said that she still feels sick, having symptoms of congestion, headaches, some loss of taste and smell and lightheadedness. She said it is off and on. No fever  or coughing.    She is asking if she can be seen today or possibly tomorrow.

## 2021-02-04 ENCOUNTER — OFFICE VISIT (OUTPATIENT)
Dept: INTERNAL MEDICINE | Age: 46
End: 2021-02-04

## 2021-02-04 VITALS
SYSTOLIC BLOOD PRESSURE: 110 MMHG | DIASTOLIC BLOOD PRESSURE: 76 MMHG | HEIGHT: 64 IN | BODY MASS INDEX: 27.18 KG/M2 | OXYGEN SATURATION: 100 % | WEIGHT: 159.2 LBS | HEART RATE: 77 BPM | TEMPERATURE: 97.1 F

## 2021-02-04 DIAGNOSIS — G44.89 CHRONIC MIXED HEADACHE SYNDROME: ICD-10-CM

## 2021-02-04 DIAGNOSIS — J30.1 ALLERGIC RHINITIS DUE TO POLLEN, UNSPECIFIED SEASONALITY: ICD-10-CM

## 2021-02-04 DIAGNOSIS — J32.9 RECURRENT SINUSITIS: ICD-10-CM

## 2021-02-04 DIAGNOSIS — F41.9 ANXIETY: ICD-10-CM

## 2021-02-04 PROCEDURE — 90686 IIV4 VACC NO PRSV 0.5 ML IM: CPT | Performed by: NURSE PRACTITIONER

## 2021-02-04 PROCEDURE — 90471 IMMUNIZATION ADMIN: CPT | Performed by: NURSE PRACTITIONER

## 2021-02-04 PROCEDURE — 99213 OFFICE O/P EST LOW 20 MIN: CPT | Performed by: NURSE PRACTITIONER

## 2021-02-04 RX ORDER — SUMATRIPTAN 50 MG/1
TABLET, FILM COATED ORAL
Qty: 20 TABLET | Refills: 0 | Status: SHIPPED | OUTPATIENT
Start: 2021-02-04

## 2021-02-04 RX ORDER — FEXOFENADINE HCL 180 MG/1
180 TABLET ORAL DAILY
Qty: 30 TABLET | Refills: 0 | COMMUNITY
Start: 2021-02-04

## 2021-02-04 RX ORDER — FLUTICASONE PROPIONATE 50 MCG
2 SPRAY, SUSPENSION (ML) NASAL DAILY
Qty: 18.2 ML | Refills: 0 | Status: SHIPPED | OUTPATIENT
Start: 2021-02-04

## 2021-02-04 RX ORDER — PROPRANOLOL HYDROCHLORIDE 120 MG/1
120 CAPSULE, EXTENDED RELEASE ORAL DAILY
Qty: 30 CAPSULE | Refills: 0 | Status: SHIPPED | OUTPATIENT
Start: 2021-02-04

## 2021-02-04 RX ORDER — AMOXICILLIN 875 MG/1
875 TABLET, COATED ORAL 2 TIMES DAILY
Qty: 20 TABLET | Refills: 0 | Status: SHIPPED | OUTPATIENT
Start: 2021-02-04 | End: 2021-02-14

## 2021-02-04 NOTE — PROGRESS NOTES
"[facility] [place] [facility]  WIN Alford Garcia / 45 y.o. / female  02/04/2021      VITAL SIGNS     Visit Vitals  /76   Pulse 77   Temp 97.1 °F (36.2 °C) (Temporal)   Ht 162.6 cm (64.02\")   Wt 72.2 kg (159 lb 3.2 oz)   SpO2 100%   BMI 27.31 kg/m²       BP Readings from Last 3 Encounters:   02/04/21 110/76   07/02/20 108/70   05/04/20 130/80     Wt Readings from Last 3 Encounters:   02/04/21 72.2 kg (159 lb 3.2 oz)   07/02/20 69.9 kg (154 lb)   05/04/20 68.5 kg (151 lb)     Body mass index is 27.31 kg/m².      MEDICATIONS     Current Outpatient Medications   Medication Sig Dispense Refill   • cholecalciferol (VITAMIN D3) 1000 units tablet Take 1,000 Units by mouth Daily.     • Collagen 500 MG capsule Take 500 mg by mouth Daily.     • fexofenadine (Allegra Allergy) 180 MG tablet Take 1 tablet by mouth Daily. 30 tablet 0   • fluticasone (FLONASE) 50 MCG/ACT nasal spray 2 sprays into the nostril(s) as directed by provider Daily. 18.2 mL 0   • methocarbamol (ROBAXIN) 750 MG tablet TAKE ONE TABLET BY MOUTH THREE TIMES A DAY AS NEEDED FOR MUSCLE SPASMS 30 tablet 0   • Multiple Vitamins-Minerals (CENTRUM ADULTS PO) Take 1 tablet by mouth Daily.     • Omega-3 Fatty Acids (FISH OIL) 1000 MG capsule capsule Take 2,000 mg by mouth Daily With Breakfast.     • Probiotic Product (PROBIOTIC DAILY PO) Take 1 capsule by mouth Daily.     • propranolol LA (INDERAL LA) 120 MG 24 hr capsule Take 1 capsule by mouth Daily. 30 capsule 0   • pseudoephedrine-guaifenesin (MUCINEX D)  MG per 12 hr tablet TAKE ONE TABLET BY MOUTH TWICE A DAY AS NEEDED FOR CONGESTION OR ALLERGIES 18 tablet 0   • SUMAtriptan (IMITREX) 50 MG tablet TAKE ONE TABLET BY MOUTH AT ONSET OF HEADACHE; MAY REPEAT ONE TABLET IN 2 HOURS IF NEEDED. 20 tablet 0   • amoxicillin (AMOXIL) 875 MG tablet Take 1 tablet by mouth 2 (Two) Times a Day for 10 days. 20 tablet 0     No current facility-administered medications for this visit.        CHIEF COMPLAINT "     Headache (positive covid 19 2021), Nasal Congestion, and Sinus Problem      ASSESSMENT & PLAN     Problem List Items Addressed This Visit        Allergies and Adverse Reactions    Allergic rhinitis    Relevant Medications    fluticasone (FLONASE) 50 MCG/ACT nasal spray    fexofenadine (Allegra Allergy) 180 MG tablet       Mental Health    Anxiety    Relevant Medications    propranolol LA (INDERAL LA) 120 MG 24 hr capsule      Other Visit Diagnoses     Recurrent sinusitis        Relevant Medications    fluticasone (FLONASE) 50 MCG/ACT nasal spray    SUMAtriptan (IMITREX) 50 MG tablet    Chronic mixed headache syndrome        Relevant Medications    SUMAtriptan (IMITREX) 50 MG tablet    propranolol LA (INDERAL LA) 120 MG 24 hr capsule        Orders Placed This Encounter   Procedures   • Fluarix/Fluzone/Afluria Quad>6 Months     New Medications Ordered This Visit   Medications   • fluticasone (FLONASE) 50 MCG/ACT nasal spray     Si sprays into the nostril(s) as directed by provider Daily.     Dispense:  18.2 mL     Refill:  0   • fexofenadine (Allegra Allergy) 180 MG tablet     Sig: Take 1 tablet by mouth Daily.     Dispense:  30 tablet     Refill:  0   • SUMAtriptan (IMITREX) 50 MG tablet     Sig: TAKE ONE TABLET BY MOUTH AT ONSET OF HEADACHE; MAY REPEAT ONE TABLET IN 2 HOURS IF NEEDED.     Dispense:  20 tablet     Refill:  0   • propranolol LA (INDERAL LA) 120 MG 24 hr capsule     Sig: Take 1 capsule by mouth Daily.     Dispense:  30 capsule     Refill:  0   • amoxicillin (AMOXIL) 875 MG tablet     Sig: Take 1 tablet by mouth 2 (Two) Times a Day for 10 days.     Dispense:  20 tablet     Refill:  0       Summary/Discussion:  • Empirically treat for acute sinusitis post viral infection.  Refilled Allegra and Flonase in which she is instructed to take daily for some sinus relief.   • Imitrex and propanolol refilled for migraine.   • Follow-up for symptoms that do not improve or worsen.    Next Appointment  with me: Visit date not found    No follow-ups on file.    _____________________________________________________________________________________    HISTORY OF PRESENT ILLNESS     Patient presents with lingering COVID-19 symptoms after testing positive on January 19, 2021.  Complaint of loss of taste and smell, significant nasal congestion, headache, fatigue, and sinus pressure.  She denies any cough, fever, body aches.    She is currently taking over-the-counter Stahist and Mucinex with mild relief.    She also needs refills on the following medications: Propanolol and Imitrex for migraine, Flonase and Allegra for allergies.    Patient Care Team:  Davina Alex APRN as PCP - General (Internal Medicine)      REVIEW OF SYSTEMS     Review of Systems   Constitutional: Positive for fatigue. Negative for chills and fever.   HENT: Positive for congestion, postnasal drip, sinus pressure and sinus pain.         Ear fullness   Respiratory: Negative for cough, chest tightness and shortness of breath.    Cardiovascular: Negative.    Neurological: Positive for headaches. Negative for dizziness and light-headedness.   Psychiatric/Behavioral: Negative.      PHYSICAL EXAMINATION     Physical Exam  Constitutional:       Appearance: Normal appearance.   HENT:      Right Ear: A middle ear effusion is present.      Left Ear: A middle ear effusion is present.   Eyes:      Pupils: Pupils are equal, round, and reactive to light.   Cardiovascular:      Rate and Rhythm: Normal rate and regular rhythm.      Pulses: Normal pulses.      Heart sounds: Normal heart sounds.   Pulmonary:      Effort: Pulmonary effort is normal.      Breath sounds: Normal breath sounds.   Neurological:      Mental Status: She is alert and oriented to person, place, and time.   Psychiatric:         Thought Content: Thought content normal.         Judgment: Judgment normal.       REVIEWED DATA     Labs:     Lab Results   Component Value Date     07/02/2020     K 4.5 07/02/2020    CALCIUM 9.3 07/02/2020    AST 14 07/02/2020    ALT 13 07/02/2020    BUN 11 07/02/2020    CREATININE 0.73 07/02/2020    CREATININE 0.66 09/07/2018    EGFRIFNONA 85 07/02/2020    EGFRIFAFRI 104 07/02/2020       Lab Results   Component Value Date    GLU 85 07/02/2020    GLU 79 09/07/2018       Lab Results   Component Value Date     (H) 09/07/2018    HDL 59 09/07/2018    TRIG 84 09/07/2018    CHOLHDLRATIO 3.61 09/07/2018       Lab Results   Component Value Date    TSH 1.510 07/02/2020       Lab Results   Component Value Date    WBC 8.64 09/07/2018    HGB 13.7 09/07/2018     09/07/2018       No results found for: PROTEIN, GLUCOSEU, BLOODU, NITRITEU, LEUKOCYTESUR    Imaging:         Medical Tests:         Summary of old records / correspondence / consultant report:         Request outside records:         ALLERGIES  Allergies   Allergen Reactions   • Watermelon [Citrullus Vulgaris] Hives   • Other Hives     STEROIDS         PFSH:     The following portions of the patient's history were reviewed and updated as appropriate: Allergies / Current Medications / Past Medical History / Surgical History / Social History / Family History    PROBLEM LIST   Patient Active Problem List   Diagnosis   • Allergic rhinitis   • Anxiety       PAST MEDICAL HISTORY  Past Medical History:   Diagnosis Date   • Allergic    • Asthma    • Headache    • Migraine headache        SURGICAL HISTORY  Past Surgical History:   Procedure Laterality Date   • HYSTERECTOMY  2009    partial        SOCIAL HISTORY  Social History     Socioeconomic History   • Marital status:      Spouse name: Not on file   • Number of children: 2   • Years of education: Not on file   • Highest education level: Not on file   Occupational History   • Occupation:       Comment: [employer]    Tobacco Use   • Smoking status: Never Smoker   • Smokeless tobacco: Never Used   Substance and Sexual Activity   • Alcohol use:  Yes     Comment: occasional    • Drug use: No   • Sexual activity: Yes   Social History Narrative    1 grandchild       FAMILY HISTORY  Family History   Problem Relation Age of Onset   • No Known Problems Mother    • No Known Problems Father          Examiner was wearing KN95 mask, face shield and exam gloves during the entire duration of the visit. Patient was masked the entire time.   Minimum social distance of 6 ft maintained entire visit except if physical contact was necessary as documented.     **Dragon Disclaimer:   Much of this encounter note is an electronic transcription/translation of spoken language to printed text. The electronic translation of spoken language may permit erroneous, or at times, nonsensical words or phrases to be inadvertently transcribed. Although I have reviewed the note for such errors, some may still exist.

## 2021-04-02 ENCOUNTER — BULK ORDERING (OUTPATIENT)
Dept: CASE MANAGEMENT | Facility: OTHER | Age: 46
End: 2021-04-02

## 2021-04-02 DIAGNOSIS — Z23 IMMUNIZATION DUE: ICD-10-CM

## 2021-11-30 ENCOUNTER — IMMUNIZATION (OUTPATIENT)
Dept: VACCINE CLINIC | Facility: HOSPITAL | Age: 46
End: 2021-11-30

## 2021-11-30 PROCEDURE — 91300 HC SARSCOV02 VAC 30MCG/0.3ML IM: CPT | Performed by: INTERNAL MEDICINE

## 2021-11-30 PROCEDURE — 0004A HC ADM SARSCOV2 30MCG/0.3ML BOOSTER: CPT | Performed by: INTERNAL MEDICINE
